# Patient Record
Sex: FEMALE | Race: AMERICAN INDIAN OR ALASKA NATIVE | NOT HISPANIC OR LATINO | Employment: OTHER | URBAN - METROPOLITAN AREA
[De-identification: names, ages, dates, MRNs, and addresses within clinical notes are randomized per-mention and may not be internally consistent; named-entity substitution may affect disease eponyms.]

---

## 2021-06-10 ENCOUNTER — OFFICE VISIT (OUTPATIENT)
Dept: CARDIOLOGY | Facility: MEDICAL CENTER | Age: 76
End: 2021-06-10
Payer: MEDICARE

## 2021-06-10 VITALS
WEIGHT: 218 LBS | HEIGHT: 61 IN | SYSTOLIC BLOOD PRESSURE: 122 MMHG | HEART RATE: 79 BPM | RESPIRATION RATE: 16 BRPM | DIASTOLIC BLOOD PRESSURE: 80 MMHG | BODY MASS INDEX: 41.16 KG/M2 | OXYGEN SATURATION: 92 %

## 2021-06-10 DIAGNOSIS — R00.2 PALPITATIONS: ICD-10-CM

## 2021-06-10 DIAGNOSIS — R06.09 DYSPNEA ON EFFORT: ICD-10-CM

## 2021-06-10 DIAGNOSIS — M79.89 LEG SWELLING: ICD-10-CM

## 2021-06-10 DIAGNOSIS — E11.65 TYPE 2 DIABETES MELLITUS WITH HYPERGLYCEMIA, WITHOUT LONG-TERM CURRENT USE OF INSULIN (HCC): ICD-10-CM

## 2021-06-10 DIAGNOSIS — I10 HYPERTENSION, UNSPECIFIED TYPE: ICD-10-CM

## 2021-06-10 LAB — EKG IMPRESSION: NORMAL

## 2021-06-10 PROCEDURE — 99204 OFFICE O/P NEW MOD 45 MIN: CPT | Mod: 25 | Performed by: INTERNAL MEDICINE

## 2021-06-10 PROCEDURE — 93000 ELECTROCARDIOGRAM COMPLETE: CPT | Performed by: INTERNAL MEDICINE

## 2021-06-10 RX ORDER — FUROSEMIDE 40 MG/1
40 TABLET ORAL DAILY
Qty: 30 TABLET | Refills: 11 | Status: SHIPPED | OUTPATIENT
Start: 2021-06-10

## 2021-06-10 RX ORDER — SPIRONOLACTONE 25 MG/1
25 TABLET ORAL DAILY
Qty: 30 TABLET | Refills: 3 | Status: SHIPPED | OUTPATIENT
Start: 2021-06-10 | End: 2021-10-01

## 2021-06-10 ASSESSMENT — ENCOUNTER SYMPTOMS
BLURRED VISION: 0
HEADACHES: 0
MEMORY LOSS: 0
SENSORY CHANGE: 0
MYALGIAS: 0
DIAPHORESIS: 0
PALPITATIONS: 0
FEVER: 0
DOUBLE VISION: 0
FALLS: 0
COUGH: 0
SHORTNESS OF BREATH: 1
BRUISES/BLEEDS EASILY: 0
ABDOMINAL PAIN: 0
DEPRESSION: 0
DIZZINESS: 0

## 2021-06-10 ASSESSMENT — MINNESOTA LIVING WITH HEART FAILURE QUESTIONNAIRE (MLHF)
MAKING YOU SHORT OF BREATH: 5
COSTING YOU MONEY FOR MEDICAL CARE: 0
DIFFICULTY SLEEPING WELL AT NIGHT: 0
SWELLING IN ANKLES OR LEGS: 5
GIVING YOU SIDE EFFECTS FROM TREATMENTS: 0
LOSS OF SELF CONTROL IN YOUR LIFE: 5
DIFFICULTY WITH RECREATIONAL PASTIMES, SPORTS, HOBBIES: 5
WORKING AROUND THE HOUSE OR YARD DIFFICULT: 5
MAKING YOU WORRY: 5
HAVING TO SIT OR LIE DOWN DURING THE DAY: 5
DIFFICULTY SOCIALIZING WITH FAMILY OR FRIENDS: 3
EATING LESS FOODS YOU LIKE: 0
MAKING YOU STAY IN A HOSPITAL: 0
DIFFICULTY WITH SEXUAL ACTIVITIES: 0
TOTAL_SCORE: 68
DIFFICULTY GOING AWAY FROM HOME: 5
DIFFICULTY TO CONCENTRATE OR REMEMBERING THINGS: 5
TIRED, FATIGUED OR LOW ON ENERGY: 5
FEELING LIKE A BURDEN TO FAMILY AND FRIENDS: 0
DIFFICULTY WORKING TO EARN A LIVING: 5
MAKING YOU FEEL DEPRESSED: 5
WALKING ABOUT OR CLIMBING STAIRS DIFFICULT: 5

## 2021-06-10 NOTE — PROGRESS NOTES
Chief Complaint   Patient presents with   • Congestive Heart Failure       Subjective:   Rosmery Landaverde is a 76 y.o. female who presents today for cardiac care and management in a heart failure clinic due to leg swelling and shortness of breath.  Patient also has a diagnosis of diabetes and hypertension.    Patient still gets winded with daily living activities and exertion. No symptoms at rest.    We do not have any records on prior cardiac work-up at this time.    No family history of sudden cardiac death.    I personally interpreted EKG tracing which shows sinus rhythm without evidence of coronary ischemia.    History reviewed. No pertinent past medical history.  History reviewed. No pertinent surgical history.  History reviewed. No pertinent family history.  Social History     Socioeconomic History   • Marital status: Single     Spouse name: Not on file   • Number of children: Not on file   • Years of education: Not on file   • Highest education level: Not on file   Occupational History   • Not on file   Tobacco Use   • Smoking status: Current Some Day Smoker     Types: Cigarettes     Start date: 6/10/1962   • Smokeless tobacco: Never Used   Vaping Use   • Vaping Use: Never used   Substance and Sexual Activity   • Alcohol use: Yes     Alcohol/week: 2.4 oz     Types: 4 Cans of beer per week   • Drug use: Never   • Sexual activity: Not on file   Other Topics Concern   • Not on file   Social History Narrative   • Not on file     Social Determinants of Health     Financial Resource Strain:    • Difficulty of Paying Living Expenses:    Food Insecurity:    • Worried About Running Out of Food in the Last Year:    • Ran Out of Food in the Last Year:    Transportation Needs:    • Lack of Transportation (Medical):    • Lack of Transportation (Non-Medical):    Physical Activity:    • Days of Exercise per Week:    • Minutes of Exercise per Session:    Stress:    • Feeling of Stress :    Social Connections:    • Frequency  "of Communication with Friends and Family:    • Frequency of Social Gatherings with Friends and Family:    • Attends [employer] Services:    • Active Member of Clubs or Organizations:    • Attends Club or Organization Meetings:    • Marital Status:    Intimate Partner Violence:    • Fear of Current or Ex-Partner:    • Emotionally Abused:    • Physically Abused:    • Sexually Abused:      Allergies   Allergen Reactions   • Morphine      Severe reaction      Outpatient Encounter Medications as of 6/10/2021   Medication Sig Dispense Refill   • furosemide (LASIX) 40 MG Tab Take 1 tablet by mouth every day. 30 tablet 11   • spironolactone (ALDACTONE) 25 MG Tab Take 1 tablet by mouth every day. 30 tablet 3     No facility-administered encounter medications on file as of 6/10/2021.     Review of Systems   Constitutional: Negative for diaphoresis and fever.   HENT: Negative for nosebleeds.    Eyes: Negative for blurred vision and double vision.   Respiratory: Positive for shortness of breath. Negative for cough.    Cardiovascular: Positive for leg swelling. Negative for chest pain and palpitations.   Gastrointestinal: Negative for abdominal pain.   Genitourinary: Negative for dysuria and frequency.   Musculoskeletal: Negative for falls and myalgias.   Skin: Negative for rash.   Neurological: Negative for dizziness, sensory change and headaches.   Endo/Heme/Allergies: Does not bruise/bleed easily.   Psychiatric/Behavioral: Negative for depression and memory loss.        Objective:   /80 (BP Location: Left arm, Patient Position: Sitting, BP Cuff Size: Adult)   Pulse 79   Resp 16   Ht 1.549 m (5' 1\")   Wt 98.9 kg (218 lb)   SpO2 92%   BMI 41.19 kg/m²     Physical Exam   Constitutional: She is oriented to person, place, and time. No distress.   HENT:   Head: Normocephalic and atraumatic.   Right Ear: External ear normal.   Left Ear: External ear normal.   Eyes: Right eye exhibits no discharge. Left eye exhibits no " discharge.   Neck: No JVD present. No thyromegaly present.   Cardiovascular: Normal rate, regular rhythm and normal heart sounds. Exam reveals no gallop and no friction rub.   No murmur heard.  Pulmonary/Chest: Breath sounds normal. No respiratory distress.   Abdominal: Bowel sounds are normal. She exhibits no distension. There is no abdominal tenderness.   Musculoskeletal:         General: Swelling present. No tenderness.      Right lower leg: Edema present.      Left lower leg: Edema present.   Neurological: She is alert and oriented to person, place, and time. No cranial nerve deficit.   Skin: Skin is warm and dry. She is not diaphoretic.   Psychiatric: Her behavior is normal.   Nursing note and vitals reviewed.      Assessment:     1. Leg swelling  EKG   2. Dyspnea on effort  EKG    EC-ECHOCARDIOGRAM COMPLETE W/O CONT   3. Hypertension, unspecified type  EKG    EC-ECHOCARDIOGRAM COMPLETE W/O CONT   4. Palpitations  EKG    EC-ECHOCARDIOGRAM COMPLETE W/O CONT   5. Type 2 diabetes mellitus with hyperglycemia, without long-term current use of insulin (HCC)         Medical Decision Making:  Today's Assessment / Status / Plan:   Patient does have significant swelling in her legs.  Patient appears to be volume overloaded.  At this time, I will start spironolactone along with furosemide for diuresis.  We will also check transthoracic echocardiogram to obtain for cardiac function and valvular function.  I will continue to closely monitor for side effects of patient's high risk medication(s) including liver, renal function and electrolytes.

## 2021-06-28 ENCOUNTER — HOSPITAL ENCOUNTER (OUTPATIENT)
Dept: LAB | Facility: MEDICAL CENTER | Age: 76
End: 2021-06-28
Attending: INTERNAL MEDICINE
Payer: MEDICARE

## 2021-06-28 ENCOUNTER — OFFICE VISIT (OUTPATIENT)
Dept: CARDIOLOGY | Facility: MEDICAL CENTER | Age: 76
End: 2021-06-28
Payer: MEDICARE

## 2021-06-28 VITALS
WEIGHT: 213 LBS | OXYGEN SATURATION: 91 % | HEIGHT: 61 IN | BODY MASS INDEX: 40.22 KG/M2 | SYSTOLIC BLOOD PRESSURE: 118 MMHG | DIASTOLIC BLOOD PRESSURE: 78 MMHG | HEART RATE: 92 BPM

## 2021-06-28 DIAGNOSIS — M79.89 LEG SWELLING: ICD-10-CM

## 2021-06-28 DIAGNOSIS — E11.65 TYPE 2 DIABETES MELLITUS WITH HYPERGLYCEMIA, WITHOUT LONG-TERM CURRENT USE OF INSULIN (HCC): ICD-10-CM

## 2021-06-28 DIAGNOSIS — R06.09 DYSPNEA ON EFFORT: ICD-10-CM

## 2021-06-28 DIAGNOSIS — R00.2 PALPITATIONS: ICD-10-CM

## 2021-06-28 DIAGNOSIS — Z79.899 HIGH RISK MEDICATIONS (NOT ANTICOAGULANTS) LONG-TERM USE: ICD-10-CM

## 2021-06-28 LAB
ANION GAP SERPL CALC-SCNC: 12 MMOL/L (ref 7–16)
BUN SERPL-MCNC: 18 MG/DL (ref 8–22)
CALCIUM SERPL-MCNC: 10.2 MG/DL (ref 8.5–10.5)
CHLORIDE SERPL-SCNC: 101 MMOL/L (ref 96–112)
CO2 SERPL-SCNC: 28 MMOL/L (ref 20–33)
CREAT SERPL-MCNC: 1.17 MG/DL (ref 0.5–1.4)
GLUCOSE SERPL-MCNC: 149 MG/DL (ref 65–99)
POTASSIUM SERPL-SCNC: 4.1 MMOL/L (ref 3.6–5.5)
SODIUM SERPL-SCNC: 141 MMOL/L (ref 135–145)

## 2021-06-28 PROCEDURE — 36415 COLL VENOUS BLD VENIPUNCTURE: CPT

## 2021-06-28 PROCEDURE — 80048 BASIC METABOLIC PNL TOTAL CA: CPT

## 2021-06-28 PROCEDURE — 99214 OFFICE O/P EST MOD 30 MIN: CPT | Performed by: INTERNAL MEDICINE

## 2021-06-28 RX ORDER — DOCUSATE SODIUM 100 MG/1
100 CAPSULE, LIQUID FILLED ORAL 2 TIMES DAILY
COMMUNITY

## 2021-06-28 RX ORDER — MELOXICAM 15 MG/1
15 TABLET ORAL DAILY
COMMUNITY
End: 2021-11-11

## 2021-06-28 RX ORDER — BUPROPION HYDROCHLORIDE 150 MG/1
150 TABLET, EXTENDED RELEASE ORAL 2 TIMES DAILY
COMMUNITY

## 2021-06-28 ASSESSMENT — ENCOUNTER SYMPTOMS
HEADACHES: 0
SENSORY CHANGE: 0
DIZZINESS: 0
DIAPHORESIS: 0
SHORTNESS OF BREATH: 1
BLURRED VISION: 0
ABDOMINAL PAIN: 0
BRUISES/BLEEDS EASILY: 0
PALPITATIONS: 0
DOUBLE VISION: 0
COUGH: 0
FALLS: 0
MYALGIAS: 0
MEMORY LOSS: 0
DEPRESSION: 0
FEVER: 0

## 2021-06-28 NOTE — PATIENT INSTRUCTIONS
No change in medicaitons.    Blood test as soon as possible (non-fasting).    Echocardiogram appointment on 08/19/2021 at 10:15 AM.

## 2021-08-19 ENCOUNTER — HOSPITAL ENCOUNTER (OUTPATIENT)
Dept: CARDIOLOGY | Facility: MEDICAL CENTER | Age: 76
End: 2021-08-19
Attending: INTERNAL MEDICINE
Payer: OTHER GOVERNMENT

## 2021-08-19 DIAGNOSIS — R06.09 DYSPNEA ON EFFORT: ICD-10-CM

## 2021-08-19 DIAGNOSIS — I10 HYPERTENSION, UNSPECIFIED TYPE: ICD-10-CM

## 2021-08-19 DIAGNOSIS — R00.2 PALPITATIONS: ICD-10-CM

## 2021-08-19 PROCEDURE — 93306 TTE W/DOPPLER COMPLETE: CPT

## 2021-08-20 LAB
LV EJECT FRACT  99904: 60
LV EJECT FRACT MOD 2C 99903: 60.67
LV EJECT FRACT MOD 4C 99902: 51.19
LV EJECT FRACT MOD BP 99901: 57.42

## 2021-08-20 PROCEDURE — 93306 TTE W/DOPPLER COMPLETE: CPT | Mod: 26 | Performed by: INTERNAL MEDICINE

## 2021-10-01 ENCOUNTER — OFFICE VISIT (OUTPATIENT)
Dept: CARDIOLOGY | Facility: MEDICAL CENTER | Age: 76
End: 2021-10-01
Payer: MEDICARE

## 2021-10-01 ENCOUNTER — PHARMACY VISIT (OUTPATIENT)
Dept: PHARMACY | Facility: MEDICAL CENTER | Age: 76
End: 2021-10-01
Payer: COMMERCIAL

## 2021-10-01 VITALS
HEIGHT: 61 IN | BODY MASS INDEX: 38.8 KG/M2 | SYSTOLIC BLOOD PRESSURE: 130 MMHG | OXYGEN SATURATION: 95 % | DIASTOLIC BLOOD PRESSURE: 70 MMHG | HEART RATE: 97 BPM | RESPIRATION RATE: 13 BRPM | WEIGHT: 205.5 LBS

## 2021-10-01 DIAGNOSIS — M79.89 LEG SWELLING: ICD-10-CM

## 2021-10-01 DIAGNOSIS — R06.09 DYSPNEA ON EFFORT: ICD-10-CM

## 2021-10-01 DIAGNOSIS — R00.2 PALPITATIONS: ICD-10-CM

## 2021-10-01 DIAGNOSIS — Z79.899 HIGH RISK MEDICATIONS (NOT ANTICOAGULANTS) LONG-TERM USE: ICD-10-CM

## 2021-10-01 DIAGNOSIS — E11.65 TYPE 2 DIABETES MELLITUS WITH HYPERGLYCEMIA, WITHOUT LONG-TERM CURRENT USE OF INSULIN (HCC): ICD-10-CM

## 2021-10-01 DIAGNOSIS — N18.31 STAGE 3A CHRONIC KIDNEY DISEASE: ICD-10-CM

## 2021-10-01 DIAGNOSIS — I10 HTN (HYPERTENSION), MALIGNANT: ICD-10-CM

## 2021-10-01 PROCEDURE — RXMED WILLOW AMBULATORY MEDICATION CHARGE: Performed by: INTERNAL MEDICINE

## 2021-10-01 PROCEDURE — 99214 OFFICE O/P EST MOD 30 MIN: CPT | Performed by: INTERNAL MEDICINE

## 2021-10-01 RX ORDER — FINERENONE 10 MG/1
10 TABLET, FILM COATED ORAL DAILY
Qty: 30 TABLET | Refills: 11 | Status: SHIPPED | OUTPATIENT
Start: 2021-10-01 | End: 2021-10-01 | Stop reason: SDUPTHER

## 2021-10-01 RX ORDER — FINERENONE 10 MG/1
10 TABLET, FILM COATED ORAL DAILY
Qty: 30 TABLET | Refills: 0 | Status: SHIPPED | OUTPATIENT
Start: 2021-10-01 | End: 2021-11-04 | Stop reason: SDUPTHER

## 2021-10-01 ASSESSMENT — ENCOUNTER SYMPTOMS
BRUISES/BLEEDS EASILY: 0
DOUBLE VISION: 0
ABDOMINAL PAIN: 0
MEMORY LOSS: 0
MYALGIAS: 0
FALLS: 0
DIZZINESS: 0
DIAPHORESIS: 0
SHORTNESS OF BREATH: 1
PALPITATIONS: 0
HEADACHES: 0
FEVER: 0
SENSORY CHANGE: 0
COUGH: 0
BLURRED VISION: 0
DEPRESSION: 0

## 2021-10-01 ASSESSMENT — MINNESOTA LIVING WITH HEART FAILURE QUESTIONNAIRE (MLHF)
WALKING ABOUT OR CLIMBING STAIRS DIFFICULT: 3
MAKING YOU SHORT OF BREATH: 5
COSTING YOU MONEY FOR MEDICAL CARE: 0
MAKING YOU STAY IN A HOSPITAL: 0
DIFFICULTY GOING AWAY FROM HOME: 4
FEELING LIKE A BURDEN TO FAMILY AND FRIENDS: 2
SWELLING IN ANKLES OR LEGS: 2
HAVING TO SIT OR LIE DOWN DURING THE DAY: 4
WORKING AROUND THE HOUSE OR YARD DIFFICULT: 3
LOSS OF SELF CONTROL IN YOUR LIFE: 2
TIRED, FATIGUED OR LOW ON ENERGY: 5
TOTAL_SCORE: 46
EATING LESS FOODS YOU LIKE: 2
DIFFICULTY SLEEPING WELL AT NIGHT: 3
MAKING YOU WORRY: 2
DIFFICULTY TO CONCENTRATE OR REMEMBERING THINGS: 3
GIVING YOU SIDE EFFECTS FROM TREATMENTS: 0
DIFFICULTY WITH RECREATIONAL PASTIMES, SPORTS, HOBBIES: 0
DIFFICULTY WORKING TO EARN A LIVING: 0
MAKING YOU FEEL DEPRESSED: 2
DIFFICULTY SOCIALIZING WITH FAMILY OR FRIENDS: 4
DIFFICULTY WITH SEXUAL ACTIVITIES: 0

## 2021-11-03 ENCOUNTER — TELEPHONE (OUTPATIENT)
Dept: SCHEDULING | Facility: IMAGING CENTER | Age: 76
End: 2021-11-03

## 2021-11-03 DIAGNOSIS — N18.31 STAGE 3A CHRONIC KIDNEY DISEASE: ICD-10-CM

## 2021-11-03 DIAGNOSIS — E11.65 TYPE 2 DIABETES MELLITUS WITH HYPERGLYCEMIA, WITHOUT LONG-TERM CURRENT USE OF INSULIN (HCC): ICD-10-CM

## 2021-11-03 NOTE — TELEPHONE ENCOUNTER
TT    Cadence Loera a nurse practioner at [facility] [place][note 2] called stating Pt cannot afford Finerenone (KERENDIA) 10 MG Tab and would like to know if Pt can take eplereone or aldactone instead. Cadence states she can prescribe the Pt one of the medications but wanted to check with TT first. Cadence can be reached at [phone] ext [phone].     Thank you

## 2021-11-04 DIAGNOSIS — E11.65 TYPE 2 DIABETES MELLITUS WITH HYPERGLYCEMIA, WITHOUT LONG-TERM CURRENT USE OF INSULIN (HCC): ICD-10-CM

## 2021-11-04 DIAGNOSIS — N18.31 STAGE 3A CHRONIC KIDNEY DISEASE: ICD-10-CM

## 2021-11-04 RX ORDER — FINERENONE 10 MG/1
10 TABLET, FILM COATED ORAL DAILY
Qty: 90 TABLET | Refills: 3 | Status: SHIPPED | OUTPATIENT
Start: 2021-11-04 | End: 2022-11-02

## 2021-11-04 RX ORDER — FINERENONE 10 MG/1
10 TABLET, FILM COATED ORAL DAILY
Qty: 90 TABLET | Refills: 3 | Status: SHIPPED | OUTPATIENT
Start: 2021-11-04 | End: 2021-11-04 | Stop reason: SDUPTHER

## 2021-11-04 NOTE — TELEPHONE ENCOUNTER
Wali [employer] Ass't  You 3 hours ago (6:50 AM)     Upon chart review, patient is covered through [facility]. Called back at phone number provided, LVM for Cadence asking for CB to discuss getting the pt a voucher. Also, it doesn't look like we ever sent the Kerendia Rx to her pharmacy.. this is the only way to start the PA process so that I get notified. Thanks   Susi     Message text     You  Wali [employer] Ass't 20 hours ago (1:50 PM)   KG  Hi! It looks like  To just started this patient on Kerendia on 10/01/21. I didn't see that the assistance process had been started. Can you please look into this? Thanks!!    Message text      ___________________________________________________________________________    Kerendia RX sent to [facility].

## 2021-11-05 ENCOUNTER — TELEPHONE (OUTPATIENT)
Dept: CARDIOLOGY | Facility: MEDICAL CENTER | Age: 76
End: 2021-11-05

## 2021-11-05 NOTE — TELEPHONE ENCOUNTER
LVM for pt in regards to lab work that was ordered at previous OV with LENIN Knowles . Office number given for call back if pt has any questions or has had lab work done outside of [facility]. Pt has follow up appointment scheduled with LENIN Knowles on 11/11/2021.

## 2021-11-11 ENCOUNTER — PHARMACY VISIT (OUTPATIENT)
Dept: PHARMACY | Facility: MEDICAL CENTER | Age: 76
End: 2021-11-11
Payer: COMMERCIAL

## 2021-11-11 ENCOUNTER — OFFICE VISIT (OUTPATIENT)
Dept: CARDIOLOGY | Facility: MEDICAL CENTER | Age: 76
End: 2021-11-11
Payer: MEDICARE

## 2021-11-11 VITALS
BODY MASS INDEX: 38.71 KG/M2 | HEIGHT: 61 IN | WEIGHT: 205 LBS | SYSTOLIC BLOOD PRESSURE: 108 MMHG | OXYGEN SATURATION: 91 % | HEART RATE: 96 BPM | DIASTOLIC BLOOD PRESSURE: 66 MMHG

## 2021-11-11 DIAGNOSIS — R06.83 SNORES: ICD-10-CM

## 2021-11-11 DIAGNOSIS — M79.89 LEG SWELLING: ICD-10-CM

## 2021-11-11 DIAGNOSIS — E11.65 TYPE 2 DIABETES MELLITUS WITH HYPERGLYCEMIA, WITHOUT LONG-TERM CURRENT USE OF INSULIN (HCC): ICD-10-CM

## 2021-11-11 DIAGNOSIS — I50.9 HEART FAILURE, NYHA CLASS 2 (HCC): ICD-10-CM

## 2021-11-11 DIAGNOSIS — Z79.899 HIGH RISK MEDICATIONS (NOT ANTICOAGULANTS) LONG-TERM USE: ICD-10-CM

## 2021-11-11 DIAGNOSIS — I10 HTN (HYPERTENSION), MALIGNANT: ICD-10-CM

## 2021-11-11 DIAGNOSIS — I50.30 ACC/AHA STAGE C HEART FAILURE WITH PRESERVED EJECTION FRACTION (HCC): ICD-10-CM

## 2021-11-11 DIAGNOSIS — R00.2 PALPITATIONS: ICD-10-CM

## 2021-11-11 DIAGNOSIS — N18.31 STAGE 3A CHRONIC KIDNEY DISEASE: ICD-10-CM

## 2021-11-11 PROCEDURE — 99214 OFFICE O/P EST MOD 30 MIN: CPT | Performed by: NURSE PRACTITIONER

## 2021-11-11 PROCEDURE — RXMED WILLOW AMBULATORY MEDICATION CHARGE: Performed by: NURSE PRACTITIONER

## 2021-11-11 RX ORDER — FINERENONE 10 MG/1
10 TABLET, FILM COATED ORAL DAILY
Qty: 30 TABLET | Refills: 0 | COMMUNITY
Start: 2021-11-11 | End: 2022-01-31

## 2021-11-11 ASSESSMENT — ENCOUNTER SYMPTOMS
ABDOMINAL PAIN: 0
PND: 0
DIZZINESS: 1
CLAUDICATION: 0
COUGH: 0
MYALGIAS: 0
ORTHOPNEA: 1
FEVER: 0
SHORTNESS OF BREATH: 1
PALPITATIONS: 0

## 2021-11-11 NOTE — PROGRESS NOTES
Chief Complaint   Patient presents with   • Congestive Heart Failure   • Palpitations       Subjective     Rosmery Landaverde is a 76 y.o. female who presents today for follow-up on her lower extremity edema, shortness of breath, diabetes, hypertension with her daughter, Evon.    Patient Dr. Soto.  She was last seen in clinic on 10/1/2021.  During that visit, she was recommended to stop spironolactone and start Kerendia.  Patient reports she was able to start the medication okay, but has run out due to her pharmacy not caring the medication.  Patient also stating that the medication has a high co-pay of $600.    Patient is wondering if she should switch to a different medication.    Patient did get follow-up lab testing in [place], [place]    Patient otherwise is reporting continued shortness of breath, some continued lower extremity edema, orthopnea where she sleeps in the recliner, urinary frequency and nocturia, and dizziness.  She denies chest pain, palpitations, PND.    She has not been weighing herself at home.    Patient's daughter reports patient does snore and notices possible apneic periods.    Patient has not been monitoring her sodium take, but states she does not use a lot of salt.    Patient reports questionable history of diabetes.  She states her primary care recently discontinued her Metformin for unknown reasons.    Patient lives in [place], [place].    History reviewed. No pertinent past medical history.  History reviewed. No pertinent surgical history.  History reviewed. No pertinent family history.  Social History     Socioeconomic History   • Marital status: Single     Spouse name: Not on file   • Number of children: Not on file   • Years of education: Not on file   • Highest education level: Not on file   Occupational History   • Not on file   Tobacco Use   • Smoking status: Current Some Day Smoker     Packs/day: 0.50     Types: Cigarettes     Start date: 6/10/1962   • Smokeless tobacco:  Never Used   • Tobacco comment: smokes 1/2 Pack on some day of the week, someday no smoking   Vaping Use   • Vaping Use: Never used   Substance and Sexual Activity   • Alcohol use: Yes     Alcohol/week: 2.4 oz     Types: 4 Cans of beer per week     Comment: few beers a couple days per week     • Drug use: Never   • Sexual activity: Not on file   Other Topics Concern   • Not on file   Social History Narrative   • Not on file     Social Determinants of Health     Financial Resource Strain:    • Difficulty of Paying Living Expenses: Not on file   Food Insecurity:    • Worried About Running Out of Food in the Last Year: Not on file   • Ran Out of Food in the Last Year: Not on file   Transportation Needs:    • Lack of Transportation (Medical): Not on file   • Lack of Transportation (Non-Medical): Not on file   Physical Activity:    • Days of Exercise per Week: Not on file   • Minutes of Exercise per Session: Not on file   Stress:    • Feeling of Stress : Not on file   Social Connections:    • Frequency of Communication with Friends and Family: Not on file   • Frequency of Social Gatherings with Friends and Family: Not on file   • Attends L[employer] Services: Not on file   • Active Member of Clubs or Organizations: Not on file   • Attends Club or Organization Meetings: Not on file   • Marital Status: Not on file   Intimate Partner Violence:    • Fear of Current or Ex-Partner: Not on file   • Emotionally Abused: Not on file   • Physically Abused: Not on file   • Sexually Abused: Not on file   Housing Stability:    • Unable to Pay for Housing in the Last Year: Not on file   • Number of Places Lived in the Last Year: Not on file   • Unstable Housing in the Last Year: Not on file     Allergies   Allergen Reactions   • Morphine      Severe reaction      Outpatient Encounter Medications as of 11/11/2021   Medication Sig Dispense Refill   • Finerenone (KERENDIA) 10 MG Tab Take 10 mg by mouth every day. 30 Tablet 0   • docusate  "sodium (COLACE) 100 MG Cap Take 100 mg by mouth 2 times a day.     • metoprolol tartrate (LOPRESSOR) 25 MG Tab Take 25 mg by mouth 2 times a day.     • buPROPion SR (WELLBUTRIN-SR) 150 MG TABLET SR 12 HR sustained-release tablet Take 150 mg by mouth 2 times a day.     • furosemide (LASIX) 40 MG Tab Take 1 tablet by mouth every day. 30 tablet 11   • Finerenone (KERENDIA) 10 MG Tab Take 10 mg by mouth every day. (Patient not taking: Reported on 11/11/2021) 90 Tablet 3   • [DISCONTINUED] meloxicam (MOBIC) 15 MG tablet Take 15 mg by mouth every day. (Patient not taking: Reported on 11/11/2021)     • [DISCONTINUED] metFORMIN (GLUCOPHAGE) 500 MG Tab Take 500 mg by mouth 2 times a day with meals. (Patient not taking: Reported on 11/11/2021)       No facility-administered encounter medications on file as of 11/11/2021.     Review of Systems   Constitutional: Negative for fever and malaise/fatigue.   Respiratory: Positive for shortness of breath. Negative for cough.    Cardiovascular: Positive for orthopnea (in recliner) and leg swelling. Negative for chest pain, palpitations, claudication and PND.   Gastrointestinal: Negative for abdominal pain.   Genitourinary: Positive for frequency (and Nocturia).   Musculoskeletal: Negative for myalgias.   Neurological: Positive for dizziness.   All other systems reviewed and are negative.             Objective     /66 (BP Location: Right arm, Patient Position: Sitting, BP Cuff Size: Adult)   Pulse 96   Ht 1.549 m (5' 1\")   Wt 93 kg (205 lb)   SpO2 91%   BMI 38.73 kg/m²     Physical Exam  Vitals reviewed.   Constitutional:       Appearance: She is well-developed. She is obese.   HENT:      Head: Normocephalic and atraumatic.   Eyes:      Pupils: Pupils are equal, round, and reactive to light.   Neck:      Vascular: No JVD.   Cardiovascular:      Rate and Rhythm: Normal rate and regular rhythm.      Heart sounds: Normal heart sounds.   Pulmonary:      Effort: Pulmonary effort " is normal. No respiratory distress.      Breath sounds: Normal breath sounds. No wheezing or rales.   Abdominal:      General: Bowel sounds are normal.      Palpations: Abdomen is soft.   Musculoskeletal:         General: Normal range of motion.      Cervical back: Normal range of motion and neck supple.      Right lower leg: Edema present.      Left lower leg: Edema present.   Skin:     General: Skin is warm and dry.   Neurological:      General: No focal deficit present.      Mental Status: She is alert and oriented to person, place, and time.   Psychiatric:         Behavior: Behavior normal.       No results found for: CHOLSTRLTOT, LDL, HDL, TRIGLYCERIDE    Lab Results   Component Value Date/Time    SODIUM 141 06/28/2021 11:53 AM    POTASSIUM 4.1 06/28/2021 11:53 AM    CHLORIDE 101 06/28/2021 11:53 AM    CO2 28 06/28/2021 11:53 AM    GLUCOSE 149 (H) 06/28/2021 11:53 AM    BUN 18 06/28/2021 11:53 AM    CREATININE 1.17 06/28/2021 11:53 AM     No results found for: ALKPHOSPHAT, ASTSGOT, ALTSGPT, TBILIRUBIN     Transthoracic Echo Report 8/19/2021  No prior study is available for comparison.   Normal left ventricular systolic function.   No evidence of valvular abnormality based on Doppler evaluation.           Assessment & Plan     1. Leg swelling  Referral to Pulmonary and Sleep Medicine   2. Snores  Referral to Pulmonary and Sleep Medicine   3. Type 2 diabetes mellitus with hyperglycemia, without long-term current use of insulin ([facility])     4. Stage 3a chronic kidney disease (HCC)     5. Palpitations     6. High risk medications (not anticoagulants) long-term use     7. HTN (hypertension), malignant     8. Heart failure, NYHA class 2 (HCC)     9. ACC/AHA stage C heart failure with preserved ejection fraction (HCC)         Medical Decision Making: Today's Assessment/Status/Plan:        HFpEF, Stage C, Class 2, LVEF 60%: Patient does continue to exhibit some lower extremity edema  -Patient has comorbid obesity,  questionable sleep apnea, patient has enlarged RV per echo  -Will hold off on changes to her regimen until lab results received  -Patient to reduce sodium intake, patient educated, will have HF RN contact patient for further education  -Continue furosemide 40 mg daily  -Will consider follow-up lab testing after lab results received  -Reinforced s/sx of worsening heart failure with patient and weight monitoring. Pt verbalizes understanding. Pt to call office or RTC if present.     CKD stage III:  -Continue Kerendia 10 mg daily, 30-day sample sent to [facility]  -[employer] tech to look into PA for medication  -If unable to afford, will switch patient to spironolactone or eplerenone    Diabetes: Questionable  -Patient no longer on Metformin per PCP  -Patient encouraged to continue follow-up with PCP    Hypertension:  -Stable  -Continue metoprolol 25 mg twice a day    Snoring:  -Discussed with patient findings of echocardiogram and concern of sleep apnea with her symptoms  -Discussed referral to sleep medicine, patient agreeable    FU in clinic in 6 weeks, will update patient if additional labs needed prior to follow-up. Sooner if needed.    Patient verbalizes understanding and agrees with the plan of care.     PLEASE NOTE: This Note was created using voice recognition Software. I have made every reasonable attempt to correct obvious errors, but I expect that there are errors of grammar and possibly content that I did not discover before finalizing the note

## 2021-11-11 NOTE — PATIENT INSTRUCTIONS
a sample of Kerendia at [facility] on [address] [facility]    Referral to sleep studies, Call [phone] if not scheduled by 1-2 weeks and schedule an appointment    We will notify you if I need additional lab testing once we receive lab results    Monitor sodium intake, try to consume less than 2000 mg of sodium per day

## 2021-11-12 ENCOUNTER — TELEPHONE (OUTPATIENT)
Dept: CARDIOLOGY | Facility: MEDICAL CENTER | Age: 76
End: 2021-11-12

## 2021-11-12 NOTE — TELEPHONE ENCOUNTER
Received call from LENIN Ren regarding patients Kerendia co-pay.    Called patients [facility] pharmacy spoke to Bret. Gave Kerendia voucher program info, Bret to CB with any issues, left CB number.     Free to patient.

## 2021-12-06 ENCOUNTER — TELEPHONE (OUTPATIENT)
Dept: CARDIOLOGY | Facility: MEDICAL CENTER | Age: 76
End: 2021-12-06

## 2021-12-06 NOTE — TELEPHONE ENCOUNTER
Attempted Calling patient for diet and heart failure education, patient did not answer and VM was full, will try again at another time

## 2021-12-23 ENCOUNTER — TELEPHONE (OUTPATIENT)
Dept: CARDIOLOGY | Facility: MEDICAL CENTER | Age: 76
End: 2021-12-23

## 2021-12-23 NOTE — TELEPHONE ENCOUNTER
Called patient regarding dietary sodium intake, patient does salt her food when cooking but she states she uses very little, suggested trying other spices or [employer] products. Patient verbalized needing to be around 2000mg of sodium daily but admitted that she does not read her nutrition labels. When asked what she ate last night she said a pancake with butter and syrup, but she was planning to have a cup of noodles as well. I educated patient about the amount of sodium in these products. Requested patient go through her pantry and read nutrition labels and we will discuss these things further on Wednesday at 11am. Patient agreed and will write down questions as she reads labels. Patient sees Shamika on Monday.

## 2021-12-27 ENCOUNTER — APPOINTMENT (OUTPATIENT)
Dept: CARDIOLOGY | Facility: MEDICAL CENTER | Age: 76
End: 2021-12-27
Payer: MEDICARE

## 2021-12-29 ENCOUNTER — TELEPHONE (OUTPATIENT)
Dept: CARDIOLOGY | Facility: MEDICAL CENTER | Age: 76
End: 2021-12-29

## 2021-12-29 NOTE — TELEPHONE ENCOUNTER
Called patient as scheduled to discuss dietary sodium restrictions, VM was full and could not leave message, will try again later.

## 2022-01-31 ENCOUNTER — HOSPITAL ENCOUNTER (OUTPATIENT)
Dept: LAB | Facility: MEDICAL CENTER | Age: 77
End: 2022-01-31
Attending: NURSE PRACTITIONER
Payer: MEDICARE

## 2022-01-31 ENCOUNTER — OFFICE VISIT (OUTPATIENT)
Dept: CARDIOLOGY | Facility: MEDICAL CENTER | Age: 77
End: 2022-01-31
Payer: MEDICARE

## 2022-01-31 VITALS
OXYGEN SATURATION: 94 % | RESPIRATION RATE: 16 BRPM | WEIGHT: 191 LBS | HEART RATE: 78 BPM | SYSTOLIC BLOOD PRESSURE: 98 MMHG | DIASTOLIC BLOOD PRESSURE: 52 MMHG | BODY MASS INDEX: 36.06 KG/M2 | HEIGHT: 61 IN

## 2022-01-31 DIAGNOSIS — N18.31 STAGE 3A CHRONIC KIDNEY DISEASE: ICD-10-CM

## 2022-01-31 DIAGNOSIS — I10 HTN (HYPERTENSION), MALIGNANT: ICD-10-CM

## 2022-01-31 DIAGNOSIS — I50.30 ACC/AHA STAGE C HEART FAILURE WITH PRESERVED EJECTION FRACTION (HCC): ICD-10-CM

## 2022-01-31 DIAGNOSIS — Z79.899 HIGH RISK MEDICATIONS (NOT ANTICOAGULANTS) LONG-TERM USE: ICD-10-CM

## 2022-01-31 DIAGNOSIS — I50.9 HEART FAILURE, NYHA CLASS 2 (HCC): ICD-10-CM

## 2022-01-31 DIAGNOSIS — R06.83 SNORES: ICD-10-CM

## 2022-01-31 LAB
ANION GAP SERPL CALC-SCNC: 16 MMOL/L (ref 7–16)
BUN SERPL-MCNC: 13 MG/DL (ref 8–22)
CALCIUM SERPL-MCNC: 9.9 MG/DL (ref 8.5–10.5)
CHLORIDE SERPL-SCNC: 96 MMOL/L (ref 96–112)
CO2 SERPL-SCNC: 28 MMOL/L (ref 20–33)
CREAT SERPL-MCNC: 1.47 MG/DL (ref 0.5–1.4)
GLUCOSE SERPL-MCNC: 102 MG/DL (ref 65–99)
POTASSIUM SERPL-SCNC: 3 MMOL/L (ref 3.6–5.5)
SODIUM SERPL-SCNC: 140 MMOL/L (ref 135–145)

## 2022-01-31 PROCEDURE — 36415 COLL VENOUS BLD VENIPUNCTURE: CPT

## 2022-01-31 PROCEDURE — 99214 OFFICE O/P EST MOD 30 MIN: CPT | Performed by: NURSE PRACTITIONER

## 2022-01-31 PROCEDURE — 80048 BASIC METABOLIC PNL TOTAL CA: CPT

## 2022-01-31 RX ORDER — ERGOCALCIFEROL 1.25 MG/1
CAPSULE ORAL
COMMUNITY

## 2022-01-31 ASSESSMENT — ENCOUNTER SYMPTOMS
ORTHOPNEA: 0
ABDOMINAL PAIN: 0
SHORTNESS OF BREATH: 1
PALPITATIONS: 0
DIZZINESS: 0
FEVER: 0
MYALGIAS: 0
COUGH: 0
CLAUDICATION: 0
PND: 0

## 2022-01-31 NOTE — PROGRESS NOTES
Chief Complaint   Patient presents with   • Palpitations   • Leg Swelling       Subjective     Rosmery Landaverde is a 77 y.o. female who presents today for follow-up on her heart failure.    Patient Dr. Soto.  She was last seen in clinic on 11/11/2021.  During that visit, no changes were made to her regimen, she is pending results of her lab tests.    Patient states she had labs done about a month ago at [facility], but no labs are in her chart.    Patient does report feeling well.  She denies significant shortness of breath.  She denies chest pain, palpitations, orthopnea, PND, edema or dizziness/lightheadedness.    She is not weighing herself at home.    She is trying to watch her sodium intake, but admits she needs to be better about reading labels.    She reports compliance with her medications.  She has been able to sort out the cost of her Kerendia.    She did not get her sleep study done.     Patient reports questionable history of diabetes.  She states her primary care recently discontinued her Metformin for unknown reasons.    Patient lives in [place], [place].    History reviewed. No pertinent past medical history.  History reviewed. No pertinent surgical history.  History reviewed. No pertinent family history.  Social History     Socioeconomic History   • Marital status: Single     Spouse name: Not on file   • Number of children: Not on file   • Years of education: Not on file   • Highest education level: Not on file   Occupational History   • Not on file   Tobacco Use   • Smoking status: Current Some Day Smoker     Packs/day: 0.50     Types: Cigarettes     Start date: 6/10/1962   • Smokeless tobacco: Never Used   • Tobacco comment: smokes 1/2 Pack on some day of the week, someday no smoking   Vaping Use   • Vaping Use: Never used   Substance and Sexual Activity   • Alcohol use: Yes     Alcohol/week: 2.4 oz     Types: 4 Cans of beer per week     Comment: few beers a couple days per week     • Drug use: Never    • Sexual activity: Not on file   Other Topics Concern   • Not on file   Social History Narrative   • Not on file     Social Determinants of Health     Financial Resource Strain:    • Difficulty of Paying Living Expenses: Not on file   Food Insecurity:    • Worried About Running Out of Food in the Last Year: Not on file   • Ran Out of Food in the Last Year: Not on file   Transportation Needs:    • Lack of Transportation (Medical): Not on file   • Lack of Transportation (Non-Medical): Not on file   Physical Activity:    • Days of Exercise per Week: Not on file   • Minutes of Exercise per Session: Not on file   Stress:    • Feeling of Stress : Not on file   Social Connections:    • Frequency of Communication with Friends and Family: Not on file   • Frequency of Social Gatherings with Friends and Family: Not on file   • Attends Mosque Services: Not on file   • Active Member of Clubs or Organizations: Not on file   • Attends Club or Organization Meetings: Not on file   • Marital Status: Not on file   Intimate Partner Violence:    • Fear of Current or Ex-Partner: Not on file   • Emotionally Abused: Not on file   • Physically Abused: Not on file   • Sexually Abused: Not on file   Housing Stability:    • Unable to Pay for Housing in the Last Year: Not on file   • Number of Places Lived in the Last Year: Not on file   • Unstable Housing in the Last Year: Not on file     Allergies   Allergen Reactions   • Morphine      Severe reaction      Outpatient Encounter Medications as of 1/31/2022   Medication Sig Dispense Refill   • Empagliflozin 10 MG Tab Take  by mouth.     • vitamin D2, Ergocalciferol, (DRISDOL) 1.25 MG (78174 UT) Cap capsule Take  by mouth every 7 days.     • Finerenone (KERENDIA) 10 MG Tab Take 10 mg by mouth every day. 90 Tablet 3   • docusate sodium (COLACE) 100 MG Cap Take 100 mg by mouth 2 times a day.     • metoprolol tartrate (LOPRESSOR) 25 MG Tab Take 25 mg by mouth 2 times a day.     • buPROPion SR  "(WELLBUTRIN-SR) 150 MG TABLET SR 12 HR sustained-release tablet Take 150 mg by mouth 2 times a day.     • furosemide (LASIX) 40 MG Tab Take 1 tablet by mouth every day. 30 tablet 11   • [DISCONTINUED] Finerenone (KERENDIA) 10 MG Tab Take 10 mg by mouth every day. 30 Tablet 0     No facility-administered encounter medications on file as of 1/31/2022.     Review of Systems   Constitutional: Positive for malaise/fatigue. Negative for fever.   Respiratory: Positive for shortness of breath. Negative for cough.    Cardiovascular: Negative for chest pain, palpitations, orthopnea, claudication, leg swelling and PND.   Gastrointestinal: Negative for abdominal pain.   Musculoskeletal: Negative for myalgias.   Neurological: Negative for dizziness.   All other systems reviewed and are negative.             Objective     BP (!) 98/52 (BP Location: Left arm, Patient Position: Sitting, BP Cuff Size: Adult)   Pulse 78   Resp 16   Ht 1.549 m (5' 1\")   Wt 86.6 kg (191 lb)   SpO2 94%   BMI 36.09 kg/m²     Physical Exam  Vitals reviewed.   Constitutional:       Appearance: She is well-developed. She is obese.   HENT:      Head: Normocephalic and atraumatic.   Eyes:      Pupils: Pupils are equal, round, and reactive to light.   Neck:      Vascular: No JVD.   Cardiovascular:      Rate and Rhythm: Normal rate and regular rhythm.      Heart sounds: Normal heart sounds.   Pulmonary:      Effort: Pulmonary effort is normal. No respiratory distress.      Breath sounds: Normal breath sounds. No wheezing or rales.   Abdominal:      General: Bowel sounds are normal.      Palpations: Abdomen is soft.   Musculoskeletal:         General: Normal range of motion.      Cervical back: Normal range of motion and neck supple.      Right lower leg: Edema (trace) present.      Left lower leg: Edema (trace) present.   Skin:     General: Skin is warm and dry.   Neurological:      General: No focal deficit present.      Mental Status: She is alert and " oriented to person, place, and time.   Psychiatric:         Behavior: Behavior normal.       No results found for: CHOLSTRLTOT, LDL, HDL, TRIGLYCERIDE    Lab Results   Component Value Date/Time    SODIUM 141 06/28/2021 11:53 AM    POTASSIUM 4.1 06/28/2021 11:53 AM    CHLORIDE 101 06/28/2021 11:53 AM    CO2 28 06/28/2021 11:53 AM    GLUCOSE 149 (H) 06/28/2021 11:53 AM    BUN 18 06/28/2021 11:53 AM    CREATININE 1.17 06/28/2021 11:53 AM     No results found for: ALKPHOSPHAT, ASTSGOT, ALTSGPT, TBILIRUBIN     Transthoracic Echo Report 8/19/2021  No prior study is available for comparison.   Normal left ventricular systolic function.   No evidence of valvular abnormality based on Doppler evaluation.           Assessment & Plan     1. Stage 3a chronic kidney disease (HCC)  Basic Metabolic Panel   2. High risk medications (not anticoagulants) long-term use  Basic Metabolic Panel   3. HTN (hypertension), malignant  Basic Metabolic Panel   4. ACC/AHA stage C heart failure with preserved ejection fraction (HCC)  Basic Metabolic Panel   5. Heart failure, NYHA class 2 (HCC)     6. Snores         Medical Decision Making: Today's Assessment/Status/Plan:         Will send pt for labs today.     Will continue to request prior lab testing from [facility].     HFpEF, Stage C, Class 2, LVEF 60%: Patient does have trace bilateral lower extremity edema  -Patient has comorbid obesity, questionable sleep apnea, patient has enlarged RV per echo  -Patient to follow-up with sleep medicine.  She states she has to get the referral through her [facility] clinic.  -Patient to continue to monitor her sodium intake  -Continue furosemide 40 mg daily  -Reinforced s/sx of worsening heart failure with patient and weight monitoring. Pt verbalizes understanding. Pt to call office or RTC if present.     CKD stage III:  -Continue Kerendia 10 mg daily    Diabetes: Questionable  -Patient no longer on Metformin per PCP  -Patient encouraged to continue  follow-up with PCP    Hypertension:  -Stable  -Continue metoprolol 25 mg twice a day    Snoring:  -Patient to follow-up with sleep medicine    FU in clinic in 3 months, will update patient if further testing or changes recommended after lab testing received.. Sooner if needed.    Patient verbalizes understanding and agrees with the plan of care.     PLEASE NOTE: This Note was created using voice recognition Software. I have made every reasonable attempt to correct obvious errors, but I expect that there are errors of grammar and possibly content that I did not discover before finalizing the note

## 2022-02-02 ENCOUNTER — TELEPHONE (OUTPATIENT)
Dept: CARDIOLOGY | Facility: MEDICAL CENTER | Age: 77
End: 2022-02-02

## 2022-02-02 DIAGNOSIS — Z79.899 HIGH RISK MEDICATIONS (NOT ANTICOAGULANTS) LONG-TERM USE: ICD-10-CM

## 2022-02-02 RX ORDER — POTASSIUM CHLORIDE 750 MG/1
10 TABLET, FILM COATED, EXTENDED RELEASE ORAL DAILY
Qty: 90 TABLET | Refills: 3 | Status: SHIPPED | OUTPATIENT
Start: 2022-02-02

## 2022-02-02 NOTE — TELEPHONE ENCOUNTER
NATHALIE Ren.  Allen Garcia R.N.  Please have her start KCL 10 mEq daily and repeat a BMP in 1 month.

## 2022-02-10 ENCOUNTER — TELEPHONE (OUTPATIENT)
Dept: CARDIOLOGY | Facility: MEDICAL CENTER | Age: 77
End: 2022-02-10

## 2022-02-10 NOTE — TELEPHONE ENCOUNTER
SOFÍA    Patient would like you to call [facility] to let them know if there is an alternative to Finerenone (KERENDIA) 10 MG Tab [    Please reach out to them at [phone]    Thank you,  Vanesa RODRIGUEZ

## 2022-02-11 NOTE — TELEPHONE ENCOUNTER
Contacted [employer]. Per chart review    Susi Ryan, Wali Ass't  11/12/21 9:28 AM  Note  Received call from LENIN Ren regarding patients Kerendia co-pay.     Called patients [facility] pharmacy spoke to Bret. Gave Kerendia voucher program info, Bret to CB with any issues, left CB number.      Free to patient.         S/W Susi APODACA- confirmed Kerendia Voucher should be good for 6 months.     S/W Bret who states first that he hasn't filled the medication but secondly then patient has filled RX twice under medicare- NOT voucher. Bret continued to state     After being on hold Bret got back on the line.- initially stated they had no voucher and that new ownership does not accept discount cards. Expressed that this voucher is from the  directly. Suddenly voucher was found and processed.     S/W pt- aware medication should be available next week.

## 2022-05-02 ENCOUNTER — OFFICE VISIT (OUTPATIENT)
Dept: CARDIOLOGY | Facility: MEDICAL CENTER | Age: 77
End: 2022-05-02
Payer: MEDICARE

## 2022-05-02 VITALS
HEIGHT: 61 IN | DIASTOLIC BLOOD PRESSURE: 60 MMHG | BODY MASS INDEX: 35.68 KG/M2 | SYSTOLIC BLOOD PRESSURE: 100 MMHG | WEIGHT: 189 LBS | RESPIRATION RATE: 16 BRPM | OXYGEN SATURATION: 93 % | HEART RATE: 72 BPM

## 2022-05-02 DIAGNOSIS — N18.31 STAGE 3A CHRONIC KIDNEY DISEASE: ICD-10-CM

## 2022-05-02 DIAGNOSIS — Z79.899 HIGH RISK MEDICATIONS (NOT ANTICOAGULANTS) LONG-TERM USE: ICD-10-CM

## 2022-05-02 DIAGNOSIS — I50.9 HEART FAILURE, NYHA CLASS 2 (HCC): ICD-10-CM

## 2022-05-02 DIAGNOSIS — R06.83 SNORES: ICD-10-CM

## 2022-05-02 DIAGNOSIS — E11.65 TYPE 2 DIABETES MELLITUS WITH HYPERGLYCEMIA, WITHOUT LONG-TERM CURRENT USE OF INSULIN (HCC): ICD-10-CM

## 2022-05-02 DIAGNOSIS — I50.30 ACC/AHA STAGE C HEART FAILURE WITH PRESERVED EJECTION FRACTION (HCC): ICD-10-CM

## 2022-05-02 DIAGNOSIS — I10 HTN (HYPERTENSION), MALIGNANT: ICD-10-CM

## 2022-05-02 PROCEDURE — 99214 OFFICE O/P EST MOD 30 MIN: CPT | Performed by: NURSE PRACTITIONER

## 2022-05-02 RX ORDER — SPIRONOLACTONE 25 MG/1
25 TABLET ORAL DAILY
COMMUNITY

## 2022-05-02 ASSESSMENT — ENCOUNTER SYMPTOMS
FEVER: 0
SHORTNESS OF BREATH: 1
ORTHOPNEA: 0
PND: 0
DIZZINESS: 1
MYALGIAS: 0
COUGH: 0
ABDOMINAL PAIN: 0
CLAUDICATION: 0
PALPITATIONS: 0
HEADACHES: 1

## 2022-05-02 NOTE — PROGRESS NOTES
Chief Complaint   Patient presents with   • Chronic Kidney Disease     F/V DX: Stage 3a chronic kidney disease (HCC)   • Leg Swelling       Subjective     Rosmery Landaverde is a 77 y.o. female who presents today for follow-up on her heart failure.    Patient Dr. Soto.  She was last seen in clinic on 1/31/2022.  During that visit, patient was recommended to get lab testing done.  Her potassium level was low and she was started on KCl 10 mEq daily.  She was recommended to get follow-up lab testing.     Patient reports she did not get the instructions and did not get follow-up lab testing done.    She reports continued episodes of feeling tired, she reports minimal shortness of breath.  She also reports episodes of occasional dizziness and headaches.  She denies chest pain, palpitations, orthopnea, PND or edema.    He does mention having some hair loss.    She is not weighing herself at home.    She is trying to watch her sodium intake.    She reports compliance with her medications.  She has been able to sort out the cost of her Kerendia.    She did not get her sleep study done.     Patient reports questionable history of diabetes.  She states her primary care recently discontinued her Metformin for unknown reasons.    Patient lives in [place], [place].    History reviewed. No pertinent past medical history.  History reviewed. No pertinent surgical history.  History reviewed. No pertinent family history.  Social History     Socioeconomic History   • Marital status: Single     Spouse name: Not on file   • Number of children: Not on file   • Years of education: Not on file   • Highest education level: Not on file   Occupational History   • Not on file   Tobacco Use   • Smoking status: Current Some Day Smoker     Packs/day: 0.50     Types: Cigarettes     Start date: 6/10/1962   • Smokeless tobacco: Never Used   • Tobacco comment: smokes 1/2 Pack on some day of the week, someday no smoking   Vaping Use   • Vaping Use: Never  used   Substance and Sexual Activity   • Alcohol use: Yes     Alcohol/week: 2.4 oz     Types: 4 Cans of beer per week     Comment: few beers a couple days per week     • Drug use: Never   • Sexual activity: Not on file   Other Topics Concern   • Not on file   Social History Narrative   • Not on file     Social Determinants of Health     Financial Resource Strain: Not on file   Food Insecurity: Not on file   Transportation Needs: Not on file   Physical Activity: Not on file   Stress: Not on file   Social Connections: Not on file   Intimate Partner Violence: Not on file   Housing Stability: Not on file     Allergies   Allergen Reactions   • Morphine      Severe reaction      Outpatient Encounter Medications as of 5/2/2022   Medication Sig Dispense Refill   • spironolactone (ALDACTONE) 25 MG Tab Take 25 mg by mouth every day.     • potassium chloride ER (KLOR-CON) 10 MEQ tablet Take 1 Tablet by mouth every day. 90 Tablet 3   • Empagliflozin 10 MG Tab Take  by mouth.     • vitamin D2, Ergocalciferol, (DRISDOL) 1.25 MG (48325 UT) Cap capsule Take  by mouth every 7 days.     • Finerenone (KERENDIA) 10 MG Tab Take 10 mg by mouth every day. 90 Tablet 3   • docusate sodium (COLACE) 100 MG Cap Take 100 mg by mouth 2 times a day.     • metoprolol tartrate (LOPRESSOR) 25 MG Tab Take 25 mg by mouth 2 times a day.     • buPROPion SR (WELLBUTRIN-SR) 150 MG TABLET SR 12 HR sustained-release tablet Take 150 mg by mouth 2 times a day.     • furosemide (LASIX) 40 MG Tab Take 1 tablet by mouth every day. 30 tablet 11     No facility-administered encounter medications on file as of 5/2/2022.     Review of Systems   Constitutional: Positive for malaise/fatigue. Negative for fever.   Respiratory: Positive for shortness of breath. Negative for cough.    Cardiovascular: Negative for chest pain, palpitations, orthopnea, claudication, leg swelling and PND.   Gastrointestinal: Negative for abdominal pain.   Musculoskeletal: Negative for  "myalgias.   Neurological: Positive for dizziness (occ) and headaches.   All other systems reviewed and are negative.             Objective     /60 (BP Location: Left arm, Patient Position: Sitting, BP Cuff Size: Adult)   Pulse 72   Resp 16   Ht 1.549 m (5' 1\")   Wt 85.7 kg (189 lb)   SpO2 93%   BMI 35.71 kg/m²     Physical Exam  Vitals reviewed.   Constitutional:       Appearance: She is well-developed. She is obese.   HENT:      Head: Normocephalic and atraumatic.   Eyes:      Pupils: Pupils are equal, round, and reactive to light.   Neck:      Vascular: No JVD.   Cardiovascular:      Rate and Rhythm: Normal rate and regular rhythm.      Heart sounds: Normal heart sounds.   Pulmonary:      Effort: Pulmonary effort is normal. No respiratory distress.      Breath sounds: Normal breath sounds. No wheezing or rales.   Abdominal:      General: Bowel sounds are normal.      Palpations: Abdomen is soft.   Musculoskeletal:         General: Normal range of motion.      Cervical back: Normal range of motion and neck supple.      Right lower leg: Edema (trace) present.      Left lower leg: Edema (trace) present.   Skin:     General: Skin is warm and dry.   Neurological:      General: No focal deficit present.      Mental Status: She is alert and oriented to person, place, and time.   Psychiatric:         Behavior: Behavior normal.       No results found for: CHOLSTRLTOT, LDL, HDL, TRIGLYCERIDE    Lab Results   Component Value Date/Time    SODIUM 140 01/31/2022 12:03 PM    POTASSIUM 3.0 (L) 01/31/2022 12:03 PM    CHLORIDE 96 01/31/2022 12:03 PM    CO2 28 01/31/2022 12:03 PM    GLUCOSE 102 (H) 01/31/2022 12:03 PM    BUN 13 01/31/2022 12:03 PM    CREATININE 1.47 (H) 01/31/2022 12:03 PM     No results found for: ALKPHOSPHAT, ASTSGOT, ALTSGPT, TBILIRUBIN     Transthoracic Echo Report 8/19/2021  No prior study is available for comparison.   Normal left ventricular systolic function.   No evidence of valvular abnormality " based on Doppler evaluation.           Assessment & Plan     1. Stage 3a chronic kidney disease (HCC)  Basic Metabolic Panel   2. High risk medications (not anticoagulants) long-term use  Basic Metabolic Panel   3. HTN (hypertension), malignant  Basic Metabolic Panel   4. ACC/AHA stage C heart failure with preserved ejection fraction (HCC)  Basic Metabolic Panel   5. Heart failure, NYHA class 2 (HCC)     6. Snores     7. Type 2 diabetes mellitus with hyperglycemia, without long-term current use of insulin (HCC)         Medical Decision Making: Today's Assessment/Status/Plan:         Patient encouraged to get BMP done in the next week.  She states it is more cost effective for her to get her labs done in [place].  She will take the lab slip to her local lab.    HFpEF, Stage C, Class 2, LVEF 60%: Based on physical examination findings, patient is euvolemic. No JVD, lungs are clear to auscultation, no pitting edema in bilateral lower extremities, no ascites.  -Patient has comorbid obesity, questionable sleep apnea, patient has enlarged RV per echo  -Patient to follow-up with sleep medicine.  She states she has to get the referral through her [place] clinic.  -Patient to continue to monitor her sodium intake  -Continue furosemide 40 mg daily and potassium 10 mEq daily  -Continue spironolactone 25 mg daily  -Continue empagliflozin 10 mg daily  -Reinforced s/sx of worsening heart failure with patient and weight monitoring. Pt verbalizes understanding. Pt to call office or RTC if present.     CKD stage III:  -Continue Kerendia 10 mg daily    Diabetes: Questionable  -Patient no longer on Metformin per PCP  -Continue empagliflozin 10 mg daily  -Patient encouraged to continue follow-up with PCP    Hypertension:  -Stable  -Continue metoprolol 25 mg twice a day    Snoring:  -Patient to follow-up with sleep medicine    Patient encouraged to follow-up with PCP for routine lab testing regarding her hair loss.  Suggested trying  biotin or collagen.    FU in clinic in 6 months with labs this week and in 6 months. Sooner if needed.    Patient verbalizes understanding and agrees with the plan of care.     PLEASE NOTE: This Note was created using voice recognition Software. I have made every reasonable attempt to correct obvious errors, but I expect that there are errors of grammar and possibly content that I did not discover before finalizing the note

## 2022-05-27 ENCOUNTER — TELEPHONE (OUTPATIENT)
Dept: CARDIOLOGY | Facility: MEDICAL CENTER | Age: 77
End: 2022-05-27
Payer: MEDICARE

## 2022-05-27 RX ORDER — FINERENONE 10 MG/1
10 TABLET, FILM COATED ORAL DAILY
Qty: 90 TABLET | Refills: 3 | Status: SHIPPED | OUTPATIENT
Start: 2022-05-27 | End: 2023-11-08

## 2022-05-27 NOTE — TELEPHONE ENCOUNTER
Maria De Jesus Garcia R.N.  PT called and said she is out of Finerenone (KERENDIA) 10 MG Tab [[id number]] and the pharmacy won't refill it. She expressed concern and she needs this medication and lives far from the pharmacy and has a person in town ready to pick it up if it can be filled and available today. I think she would like a call back as well.   Thanks!   [facility]       S/W pharmacy- provided new Kerendia Trial Offer Voucher   BIN [id number]  [facility]  GRP# [id number]  ID# [id number]    S/W first coupon must be used by 4/30/22    Kerendia coupons are not processing at this time. Susi ASCENCIO is reaching out to rep

## 2022-05-31 NOTE — TELEPHONE ENCOUNTER
S/W pharmacy again. Per them, 800 number did not provide any solution at this time. Stated pt has already used the voucher max number of times (2) and therefore cannot receive additional free use.     To SOFÍA, at this time, we have no solutions, want to make a change in therapy?    To Susi ASCENCIO

## 2022-06-02 NOTE — TELEPHONE ENCOUNTER
SOFÍA    Caller: Rosmeyr CECILLERadha Akhil    Medication Name and Dosage: Finerenone (KERENDIA) 10 MG Tab    Did patient contact pharmacy (If no, please call pharmacy first): Unsure of pharmacy, pt is waiting to hear back from Cardiology about a Pharmacy that is close to her that takes this medication.    Medication amount left: completely out of her medication      Preferred Pharmacy: Pt is not sure at this time and was checking in with the office on the status of this with this medication.    Other questions (Topic): Rosmery called in and wanted to check on the status of this medication refill.    Callback Number (Will only call for issues):[phone]    Thank you,  Adriane BROWNING

## 2022-06-10 ENCOUNTER — TELEPHONE (OUTPATIENT)
Dept: CARDIOLOGY | Facility: MEDICAL CENTER | Age: 77
End: 2022-06-10

## 2022-06-10 NOTE — TELEPHONE ENCOUNTER
SOFÍA      Caller: Rosmery Landaverde    Topic/issue: Patient requested a call back because she was told by her pharmacy that her insurances will no longer cover this medication:Finerenone (KERENDIA) 10 MG Tab  Shes asking for a call back to discuss because she is completely out.  Callback Number: [phone] (home)       Thank you.  Mich LEES

## 2022-06-10 NOTE — TELEPHONE ENCOUNTER
S/W pt, reviewed tele note 5/27, informed of Allen MADERA's effort at trying to get this medication covered and no alternatives at this time. Per SOFÍA, instruction pt to wait for now. Pt has no further questions at this time.

## 2022-07-25 ENCOUNTER — TELEPHONE (OUTPATIENT)
Dept: CARDIOLOGY | Facility: MEDICAL CENTER | Age: 77
End: 2022-07-25
Payer: MEDICARE

## 2022-07-25 NOTE — TELEPHONE ENCOUNTER
Attempted to contact pt to see about possible interest in [facility] for her Kerendia. Unable to LVM. VM box full.

## 2022-11-02 ENCOUNTER — TELEPHONE (OUTPATIENT)
Dept: CARDIOLOGY | Facility: MEDICAL CENTER | Age: 77
End: 2022-11-02

## 2022-11-02 ENCOUNTER — OFFICE VISIT (OUTPATIENT)
Dept: CARDIOLOGY | Facility: MEDICAL CENTER | Age: 77
End: 2022-11-02
Payer: MEDICARE

## 2022-11-02 VITALS
WEIGHT: 194 LBS | BODY MASS INDEX: 36.63 KG/M2 | HEIGHT: 61 IN | HEART RATE: 76 BPM | DIASTOLIC BLOOD PRESSURE: 70 MMHG | SYSTOLIC BLOOD PRESSURE: 110 MMHG | OXYGEN SATURATION: 92 % | RESPIRATION RATE: 16 BRPM

## 2022-11-02 DIAGNOSIS — I10 HTN (HYPERTENSION), MALIGNANT: ICD-10-CM

## 2022-11-02 DIAGNOSIS — N18.31 STAGE 3A CHRONIC KIDNEY DISEASE: ICD-10-CM

## 2022-11-02 DIAGNOSIS — Z79.899 HIGH RISK MEDICATIONS (NOT ANTICOAGULANTS) LONG-TERM USE: ICD-10-CM

## 2022-11-02 DIAGNOSIS — I50.9 HEART FAILURE, NYHA CLASS 2 (HCC): ICD-10-CM

## 2022-11-02 DIAGNOSIS — R06.83 SNORES: ICD-10-CM

## 2022-11-02 DIAGNOSIS — I50.30 ACC/AHA STAGE C HEART FAILURE WITH PRESERVED EJECTION FRACTION (HCC): ICD-10-CM

## 2022-11-02 DIAGNOSIS — E11.65 TYPE 2 DIABETES MELLITUS WITH HYPERGLYCEMIA, WITHOUT LONG-TERM CURRENT USE OF INSULIN (HCC): ICD-10-CM

## 2022-11-02 PROCEDURE — 99214 OFFICE O/P EST MOD 30 MIN: CPT | Performed by: NURSE PRACTITIONER

## 2022-11-02 ASSESSMENT — MINNESOTA LIVING WITH HEART FAILURE QUESTIONNAIRE (MLHF)
TOTAL_SCORE: 56
MAKING YOU WORRY: 3
DIFFICULTY TO CONCENTRATE OR REMEMBERING THINGS: 3
EATING LESS FOODS YOU LIKE: 0
DIFFICULTY SLEEPING WELL AT NIGHT: 3
WALKING ABOUT OR CLIMBING STAIRS DIFFICULT: 3
DIFFICULTY WORKING TO EARN A LIVING: 5
WORKING AROUND THE HOUSE OR YARD DIFFICULT: 4
DIFFICULTY WITH SEXUAL ACTIVITIES: 0
DIFFICULTY WITH RECREATIONAL PASTIMES, SPORTS, HOBBIES: 5
TIRED, FATIGUED OR LOW ON ENERGY: 4
DIFFICULTY GOING AWAY FROM HOME: 3
FEELING LIKE A BURDEN TO FAMILY AND FRIENDS: 3
SWELLING IN ANKLES OR LEGS: 0
COSTING YOU MONEY FOR MEDICAL CARE: 3
LOSS OF SELF CONTROL IN YOUR LIFE: 3
HAVING TO SIT OR LIE DOWN DURING THE DAY: 4
MAKING YOU FEEL DEPRESSED: 2
GIVING YOU SIDE EFFECTS FROM TREATMENTS: 0
MAKING YOU STAY IN A HOSPITAL: 0
MAKING YOU SHORT OF BREATH: 4
DIFFICULTY SOCIALIZING WITH FAMILY OR FRIENDS: 4

## 2022-11-02 ASSESSMENT — ENCOUNTER SYMPTOMS
ABDOMINAL PAIN: 0
COUGH: 0
ORTHOPNEA: 0
MYALGIAS: 0
FEVER: 0
DIZZINESS: 1
CLAUDICATION: 0
PALPITATIONS: 0
PND: 0
HEADACHES: 0
SHORTNESS OF BREATH: 1

## 2022-11-02 NOTE — PROGRESS NOTES
Chief Complaint   Patient presents with    Chronic Kidney Disease     F/v dx: Stage 3a chronic kidney disease (HCC)    Palpitations    Edema       Subjective     Rosmery Landaverde is a 77 y.o. female who presents today for follow-up on her heart failure with her daughter, Amanda.     Patient Dr. Soto.  She was last seen in clinic on 5/2/2022.  During that visit, patient was recommended to get lab testing done.  She was also recommended to complete her sleep study.    Patient reports she has been doing okay since her last visit.  She does report having some balance problems especially if she moves quickly.  She otherwise does have shortness of breath with exertion and fatigue.  She denies chest pain, palpitations, orthopnea, PND or edema.    She reports she is not weighing herself regularly at home.    She believes today she is not taking spironolactone, but may have recently been restarted on medication.  She will let us know    She is trying to watch her sodium intake.    She reports compliance with her medications.  She has been able to sort out the cost of her Kerendia.    She did not get her sleep study done.     Patient reports questionable history of diabetes.  She states her primary care recently discontinued her Metformin for unknown reasons.    Patient lives in [place], [place].    History reviewed. No pertinent past medical history.  History reviewed. No pertinent surgical history.  History reviewed. No pertinent family history.  Social History     Socioeconomic History    Marital status: Single     Spouse name: Not on file    Number of children: Not on file    Years of education: Not on file    Highest education level: Not on file   Occupational History    Not on file   Tobacco Use    Smoking status: Some Days     Packs/day: 0.50     Types: Cigarettes     Start date: 6/10/1962    Smokeless tobacco: Never    Tobacco comments:     smokes 1/2 Pack on some day of the week, someday no smoking   Vaping Use     Vaping Use: Never used   Substance and Sexual Activity    Alcohol use: Yes     Alcohol/week: 2.4 oz     Types: 4 Cans of beer per week     Comment: few beers a couple days per week      Drug use: Never    Sexual activity: Not on file   Other Topics Concern    Not on file   Social History Narrative    Not on file     Social Determinants of Health     Financial Resource Strain: Not on file   Food Insecurity: Not on file   Transportation Needs: Not on file   Physical Activity: Not on file   Stress: Not on file   Social Connections: Not on file   Intimate Partner Violence: Not on file   Housing Stability: Not on file     Allergies   Allergen Reactions    Morphine      Severe reaction      Outpatient Encounter Medications as of 11/2/2022   Medication Sig Dispense Refill    Finerenone (KERENDIA) 10 MG Tab Take 10 mg by mouth every day. 90 Tablet 3    spironolactone (ALDACTONE) 25 MG Tab Take 25 mg by mouth every day.      potassium chloride ER (KLOR-CON) 10 MEQ tablet Take 1 Tablet by mouth every day. 90 Tablet 3    Empagliflozin 10 MG Tab Take  by mouth.      vitamin D2, Ergocalciferol, (DRISDOL) 1.25 MG (09079 UT) Cap capsule Take  by mouth every 7 days.      docusate sodium (COLACE) 100 MG Cap Take 100 mg by mouth 2 times a day.      metoprolol tartrate (LOPRESSOR) 25 MG Tab Take 25 mg by mouth 2 times a day.      buPROPion SR (WELLBUTRIN-SR) 150 MG TABLET SR 12 HR sustained-release tablet Take 150 mg by mouth 2 times a day.      furosemide (LASIX) 40 MG Tab Take 1 tablet by mouth every day. 30 tablet 11    [DISCONTINUED] Finerenone (KERENDIA) 10 MG Tab Take 10 mg by mouth every day. 90 Tablet 3     No facility-administered encounter medications on file as of 11/2/2022.     Review of Systems   Constitutional:  Positive for malaise/fatigue. Negative for fever.   Respiratory:  Positive for shortness of breath (with exertion). Negative for cough.    Cardiovascular:  Negative for chest pain, palpitations, orthopnea,  "claudication, leg swelling and PND.   Gastrointestinal:  Negative for abdominal pain.   Musculoskeletal:  Negative for myalgias.   Neurological:  Positive for dizziness (occ). Negative for headaches.   All other systems reviewed and are negative.           Objective     /70 (BP Location: Left arm, Patient Position: Sitting, BP Cuff Size: Adult)   Pulse 76   Resp 16   Ht 1.549 m (5' 1\")   Wt 88 kg (194 lb)   SpO2 92%   BMI 36.66 kg/m²     Physical Exam  Vitals reviewed.   Constitutional:       Appearance: She is well-developed. She is obese.   HENT:      Head: Normocephalic and atraumatic.   Eyes:      Pupils: Pupils are equal, round, and reactive to light.   Neck:      Vascular: No JVD.   Cardiovascular:      Rate and Rhythm: Normal rate and regular rhythm.      Heart sounds: Normal heart sounds.   Pulmonary:      Effort: Pulmonary effort is normal. No respiratory distress.      Breath sounds: Normal breath sounds. No wheezing or rales.   Abdominal:      General: Bowel sounds are normal.      Palpations: Abdomen is soft.   Musculoskeletal:         General: Normal range of motion.      Cervical back: Normal range of motion and neck supple.      Right lower leg: Edema (trace) present.      Left lower leg: Edema (trace) present.   Skin:     General: Skin is warm and dry.   Neurological:      General: No focal deficit present.      Mental Status: She is alert and oriented to person, place, and time.   Psychiatric:         Behavior: Behavior normal.     No results found for: CHOLSTRLTOT, LDL, HDL, TRIGLYCERIDE    Lab Results   Component Value Date/Time    SODIUM 140 01/31/2022 12:03 PM    POTASSIUM 3.0 (L) 01/31/2022 12:03 PM    CHLORIDE 96 01/31/2022 12:03 PM    CO2 28 01/31/2022 12:03 PM    GLUCOSE 102 (H) 01/31/2022 12:03 PM    BUN 13 01/31/2022 12:03 PM    CREATININE 1.47 (H) 01/31/2022 12:03 PM     No results found for: ALKPHOSPHAT, ASTSGOT, ALTSGPT, TBILIRUBIN     Transthoracic Echo Report 8/19/2021  No " prior study is available for comparison.   Normal left ventricular systolic function.   No evidence of valvular abnormality based on Doppler evaluation.           Assessment & Plan     1. ACC/AHA stage C heart failure with preserved ejection fraction (HCC)  Referral to Pulmonary and Sleep Medicine    Comp Metabolic Panel    Lipid Profile      2. Heart failure, NYHA class 2 (HCC)  Referral to Pulmonary and Sleep Medicine    Comp Metabolic Panel    Lipid Profile      3. HTN (hypertension), malignant        4. High risk medications (not anticoagulants) long-term use        5. Stage 3a chronic kidney disease (HCC)        6. Type 2 diabetes mellitus with hyperglycemia, without long-term current use of insulin (HCC)        7. Snores            Medical Decision Making: Today's Assessment/Status/Plan:         Will request lab results that she did last week    Patient also to verify if she is taking spironolactone and notify our office.    HFpEF, Stage C, Class 2, LVEF 60%: Based on physical examination findings, patient is euvolemic. No JVD, lungs are clear to auscultation, no pitting edema in bilateral lower extremities, no ascites.  -Patient has comorbid obesity, questionable sleep apnea, patient has enlarged RV per echo  -Sleeps study referral reordered.  Patient hoping to get done in [place], or schedule  -Patient to continue to monitor her sodium intake  -Continue furosemide 40 mg daily and potassium 10 mEq daily  -Continue spironolactone 25 mg daily???  Patient to verify  -Continue empagliflozin 10 mg daily  -Reinforced s/sx of worsening heart failure with patient and weight monitoring. Pt verbalizes understanding. Pt to call office or RTC if present.     CKD stage III:  -Continue Kerendia 10 mg daily    Diabetes: Questionable  -Patient no longer on Metformin per PCP  -Continue empagliflozin 10 mg daily  -Patient encouraged to continue follow-up with PCP    Hypertension:  -Stable  -Continue metoprolol 25 mg twice a  day    Snoring:  -Patient to follow-up with sleep medicine, new referral placed    CMP and lipid panel ordered for 6 months    FU in clinic in 6 months with labs. Sooner if needed.    Patient verbalizes understanding and agrees with the plan of care.     PLEASE NOTE: This Note was created using voice recognition Software. I have made every reasonable attempt to correct obvious errors, but I expect that there are errors of grammar and possibly content that I did not discover before finalizing the note

## 2023-04-28 ENCOUNTER — TELEPHONE (OUTPATIENT)
Dept: CARDIOLOGY | Facility: MEDICAL CENTER | Age: 78
End: 2023-04-28
Payer: MEDICARE

## 2023-04-28 NOTE — TELEPHONE ENCOUNTER
05/11/2023    Received records from [facility] records scan in.      LVM for pt in regards to lab work that was ordered at previous OV with LENIN Knowles. Office number given for call back if pt has any questions or has had lab work done outside of [facility]. Pt has follow up appointment scheduled with LENIN Knowles on.

## 2023-05-08 ENCOUNTER — OFFICE VISIT (OUTPATIENT)
Dept: CARDIOLOGY | Facility: MEDICAL CENTER | Age: 78
End: 2023-05-08
Payer: MEDICARE

## 2023-05-08 VITALS
WEIGHT: 188 LBS | BODY MASS INDEX: 35.5 KG/M2 | RESPIRATION RATE: 13 BRPM | DIASTOLIC BLOOD PRESSURE: 66 MMHG | OXYGEN SATURATION: 91 % | HEART RATE: 103 BPM | SYSTOLIC BLOOD PRESSURE: 110 MMHG | HEIGHT: 61 IN

## 2023-05-08 DIAGNOSIS — I10 HTN (HYPERTENSION), MALIGNANT: ICD-10-CM

## 2023-05-08 DIAGNOSIS — I50.30 ACC/AHA STAGE C HEART FAILURE WITH PRESERVED EJECTION FRACTION (HCC): ICD-10-CM

## 2023-05-08 DIAGNOSIS — N18.32 STAGE 3B CHRONIC KIDNEY DISEASE (CKD): ICD-10-CM

## 2023-05-08 DIAGNOSIS — Z79.899 HIGH RISK MEDICATIONS (NOT ANTICOAGULANTS) LONG-TERM USE: ICD-10-CM

## 2023-05-08 DIAGNOSIS — I50.9 HEART FAILURE, NYHA CLASS 2 (HCC): ICD-10-CM

## 2023-05-08 PROCEDURE — 99214 OFFICE O/P EST MOD 30 MIN: CPT | Performed by: NURSE PRACTITIONER

## 2023-05-08 PROCEDURE — 93000 ELECTROCARDIOGRAM COMPLETE: CPT | Performed by: INTERNAL MEDICINE

## 2023-05-08 ASSESSMENT — ENCOUNTER SYMPTOMS
PND: 0
CLAUDICATION: 0
ABDOMINAL PAIN: 0
FEVER: 0
HEADACHES: 0
SHORTNESS OF BREATH: 1
ORTHOPNEA: 0
MYALGIAS: 0
DIZZINESS: 0
PALPITATIONS: 0
COUGH: 0

## 2023-05-08 ASSESSMENT — MINNESOTA LIVING WITH HEART FAILURE QUESTIONNAIRE (MLHF)
HAVING TO SIT OR LIE DOWN DURING THE DAY: 4
MAKING YOU WORRY: 3
TOTAL_SCORE: 61
DIFFICULTY TO CONCENTRATE OR REMEMBERING THINGS: 4
MAKING YOU FEEL DEPRESSED: 3
DIFFICULTY SOCIALIZING WITH FAMILY OR FRIENDS: 4
SWELLING IN ANKLES OR LEGS: 3
FEELING LIKE A BURDEN TO FAMILY AND FRIENDS: 4
MAKING YOU STAY IN A HOSPITAL: 0
WORKING AROUND THE HOUSE OR YARD DIFFICULT: 4
DIFFICULTY SLEEPING WELL AT NIGHT: 4
LOSS OF SELF CONTROL IN YOUR LIFE: 4
DIFFICULTY WITH SEXUAL ACTIVITIES: 0
EATING LESS FOODS YOU LIKE: 3
DIFFICULTY WITH RECREATIONAL PASTIMES, SPORTS, HOBBIES: 3
COSTING YOU MONEY FOR MEDICAL CARE: 0
TIRED, FATIGUED OR LOW ON ENERGY: 5
WALKING ABOUT OR CLIMBING STAIRS DIFFICULT: 4
MAKING YOU SHORT OF BREATH: 5
DIFFICULTY WORKING TO EARN A LIVING: 0
DIFFICULTY GOING AWAY FROM HOME: 4
GIVING YOU SIDE EFFECTS FROM TREATMENTS: 0

## 2023-05-08 NOTE — PROGRESS NOTES
Chief Complaint   Patient presents with    Congestive Heart Failure       Subjective     Rosmery Landaverde is a 78 y.o. female who presents today for follow-up on her heart failure with her daughter, Evon.     Patient Dr. Soto.  She was last seen in clinic on 11/2/2022.  During that visit, patient to verify some of her medications.    She reports her provider in [facility] at the [facility] wellness center wanting to provide list of medications that patient should be potentially taking.    Patient states Cadence was supposed to send over her active list from the clinic to verify as well.    Patient reports she has been doing well, continues to have shortness of breath, fatigue.    Patient feels well, denies chest pain, palpitations, dizziness/lightheadedness, orthopnea, PND or Edema.      She reports she is planning on getting additional labs done for her PCP in 2 weeks.    Patient reports questionable history of diabetes.  She states her primary care previously discontinued her Metformin for unknown reasons.    Patient lives in [place], [place].    History reviewed. No pertinent past medical history.  History reviewed. No pertinent surgical history.  History reviewed. No pertinent family history.  Social History     Socioeconomic History    Marital status: Single     Spouse name: Not on file    Number of children: Not on file    Years of education: Not on file    Highest education level: Not on file   Occupational History    Not on file   Tobacco Use    Smoking status: Some Days     Packs/day: 0.50     Types: Cigarettes     Start date: 6/10/1962    Smokeless tobacco: Never    Tobacco comments:     smokes 1/2 Pack on some day of the week, someday no smoking   Vaping Use    Vaping Use: Never used   Substance and Sexual Activity    Alcohol use: Yes     Alcohol/week: 2.4 oz     Types: 4 Cans of beer per week     Comment: few beers a couple days per week      Drug use: Never    Sexual activity: Not on file   Other Topics  Concern    Not on file   Social History Narrative    Not on file     Social Determinants of Health     Financial Resource Strain: Not on file   Food Insecurity: Not on file   Transportation Needs: Not on file   Physical Activity: Not on file   Stress: Not on file   Social Connections: Not on file   Intimate Partner Violence: Not on file   Housing Stability: Not on file     Allergies   Allergen Reactions    Morphine      Severe reaction      Outpatient Encounter Medications as of 5/8/2023   Medication Sig Dispense Refill    potassium chloride ER (KLOR-CON) 10 MEQ tablet Take 1 Tablet by mouth every day. 90 Tablet 3    Empagliflozin 10 MG Tab Take 10 mg by mouth every day.      vitamin D2, Ergocalciferol, (DRISDOL) 1.25 MG (39267 UT) Cap capsule Take  by mouth every 7 days.      docusate sodium (COLACE) 100 MG Cap Take 100 mg by mouth 2 times a day.      buPROPion SR (WELLBUTRIN-SR) 150 MG TABLET SR 12 HR sustained-release tablet Take 150 mg by mouth 2 times a day.      furosemide (LASIX) 40 MG Tab Take 1 tablet by mouth every day. 30 tablet 11    Finerenone (KERENDIA) 10 MG Tab Take 10 mg by mouth every day. (Patient not taking: Reported on 5/8/2023) 90 Tablet 3    spironolactone (ALDACTONE) 25 MG Tab Take 25 mg by mouth every day.      metoprolol tartrate (LOPRESSOR) 25 MG Tab Take 25 mg by mouth 2 times a day. (Patient not taking: Reported on 5/8/2023)       No facility-administered encounter medications on file as of 5/8/2023.     Review of Systems   Constitutional:  Positive for malaise/fatigue. Negative for fever.   Respiratory:  Positive for shortness of breath (with exertion). Negative for cough.    Cardiovascular:  Negative for chest pain, palpitations, orthopnea, claudication, leg swelling and PND.   Gastrointestinal:  Negative for abdominal pain.   Musculoskeletal:  Negative for myalgias.   Neurological:  Negative for dizziness and headaches.   All other systems reviewed and are negative.      "      Objective     /66 (BP Location: Left arm, Patient Position: Sitting, BP Cuff Size: Adult)   Pulse (!) 103   Resp 13   Ht 1.549 m (5' 1\")   Wt 85.3 kg (188 lb)   SpO2 91%   BMI 35.52 kg/m²     Physical Exam  Vitals reviewed.   Constitutional:       Appearance: She is well-developed. She is obese.   HENT:      Head: Normocephalic and atraumatic.   Eyes:      Pupils: Pupils are equal, round, and reactive to light.   Neck:      Vascular: No JVD.   Cardiovascular:      Rate and Rhythm: Normal rate and regular rhythm.      Heart sounds: Normal heart sounds.   Pulmonary:      Effort: Pulmonary effort is normal. No respiratory distress.      Breath sounds: Normal breath sounds. No wheezing or rales.   Abdominal:      General: Bowel sounds are normal.      Palpations: Abdomen is soft.   Musculoskeletal:         General: Normal range of motion.      Cervical back: Normal range of motion and neck supple.      Right lower leg: Edema (trace) present.      Left lower leg: Edema (trace) present.   Skin:     General: Skin is warm and dry.   Neurological:      General: No focal deficit present.      Mental Status: She is alert and oriented to person, place, and time.   Psychiatric:         Behavior: Behavior normal.     No results found for: CHOLSTRLTOT, LDL, HDL, TRIGLYCERIDE    Lab Results   Component Value Date/Time    SODIUM 140 01/31/2022 12:03 PM    POTASSIUM 3.0 (L) 01/31/2022 12:03 PM    CHLORIDE 96 01/31/2022 12:03 PM    CO2 28 01/31/2022 12:03 PM    GLUCOSE 102 (H) 01/31/2022 12:03 PM    BUN 13 01/31/2022 12:03 PM    CREATININE 1.47 (H) 01/31/2022 12:03 PM     No results found for: ALKPHOSPHAT, ASTSGOT, ALTSGPT, TBILIRUBIN     Transthoracic Echo Report 8/19/2021  No prior study is available for comparison.   Normal left ventricular systolic function.   No evidence of valvular abnormality based on Doppler evaluation.           Assessment & Plan     1. ACC/AHA stage C heart failure with preserved ejection " fraction (HCC)        2. HTN (hypertension), malignant  EKG      3. Heart failure, NYHA class 2 (HCC)        4. High risk medications (not anticoagulants) long-term use        5. Stage 3a chronic kidney disease (HCC)        6. Type 2 diabetes mellitus with hyperglycemia, without long-term current use of insulin (HCC)            Medical Decision Making: Today's Assessment/Status/Plan:         We will request medication list from [facility]ence at [facility], extension 105    HFpEF, Stage C, Class 2, LVEF 60%: Based on physical examination findings, patient is euvolemic. No JVD, lungs are clear to auscultation, no pitting edema in bilateral lower extremities, no ascites.  -Patient has comorbid obesity, questionable sleep apnea, patient has enlarged RV per echo  -At this time, patient is stable  -Unsure if patient saw sleep study yet  -Patient to continue to monitor her sodium intake  -Continue furosemide 40 mg daily and potassium 10 mEq daily  -Patient should be off of spironolactone as she is taking Kerendia  -Continue empagliflozin 10 mg daily  -Reinforced s/sx of worsening heart failure with patient and weight monitoring. Pt verbalizes understanding. Pt to call office or RTC if present.     CKD stage III:  -She believes she is still taking Kerendia 10 mg daily, she will verify, discussed that she should not be on spironolactone if taking Kerendia    Diabetes: Questionable  -Patient no longer on Metformin per PCP  -Continue empagliflozin 10 mg daily  -Patient encouraged to continue follow-up with PCP    Hypertension:  -Stable  -Continue metoprolol 25 mg twice a day???  Patient will verify if she is taking this    Snoring:  -She was previously referred over to sleep medicine    Patient is planning on getting labs done at the [facility] in a couple weeks.  She states she will try to forward a copy to our office.    FU in clinic in 6 months with labs. Sooner if needed.    Patient verbalizes  understanding and agrees with the plan of care.     PLEASE NOTE: This Note was created using voice recognition Software. I have made every reasonable attempt to correct obvious errors, but I expect that there are errors of grammar and possibly content that I did not discover before finalizing the note

## 2023-05-09 LAB — EKG IMPRESSION: NORMAL

## 2023-08-08 ENCOUNTER — TELEPHONE (OUTPATIENT)
Dept: HEALTH INFORMATION MANAGEMENT | Facility: OTHER | Age: 78
End: 2023-08-08
Payer: MEDICARE

## 2023-11-08 ENCOUNTER — OFFICE VISIT (OUTPATIENT)
Dept: CARDIOLOGY | Facility: MEDICAL CENTER | Age: 78
End: 2023-11-08
Attending: NURSE PRACTITIONER
Payer: MEDICARE

## 2023-11-08 VITALS
BODY MASS INDEX: 33.04 KG/M2 | HEART RATE: 76 BPM | DIASTOLIC BLOOD PRESSURE: 70 MMHG | OXYGEN SATURATION: 98 % | SYSTOLIC BLOOD PRESSURE: 102 MMHG | HEIGHT: 61 IN | RESPIRATION RATE: 16 BRPM | WEIGHT: 175 LBS

## 2023-11-08 DIAGNOSIS — I50.9 HEART FAILURE, NYHA CLASS 2 (HCC): ICD-10-CM

## 2023-11-08 DIAGNOSIS — E11.65 TYPE 2 DIABETES MELLITUS WITH HYPERGLYCEMIA, WITHOUT LONG-TERM CURRENT USE OF INSULIN (HCC): ICD-10-CM

## 2023-11-08 DIAGNOSIS — Z79.899 HIGH RISK MEDICATIONS (NOT ANTICOAGULANTS) LONG-TERM USE: ICD-10-CM

## 2023-11-08 DIAGNOSIS — N18.32 STAGE 3B CHRONIC KIDNEY DISEASE (CKD): ICD-10-CM

## 2023-11-08 DIAGNOSIS — R06.83 SNORES: ICD-10-CM

## 2023-11-08 DIAGNOSIS — I10 HTN (HYPERTENSION), MALIGNANT: ICD-10-CM

## 2023-11-08 DIAGNOSIS — Z79.899 HIGH RISK MEDICATION USE: ICD-10-CM

## 2023-11-08 DIAGNOSIS — I50.30 ACC/AHA STAGE C HEART FAILURE WITH PRESERVED EJECTION FRACTION (HCC): ICD-10-CM

## 2023-11-08 PROCEDURE — 3074F SYST BP LT 130 MM HG: CPT | Performed by: NURSE PRACTITIONER

## 2023-11-08 PROCEDURE — 99213 OFFICE O/P EST LOW 20 MIN: CPT | Performed by: NURSE PRACTITIONER

## 2023-11-08 PROCEDURE — 99212 OFFICE O/P EST SF 10 MIN: CPT | Performed by: NURSE PRACTITIONER

## 2023-11-08 PROCEDURE — 99214 OFFICE O/P EST MOD 30 MIN: CPT | Performed by: NURSE PRACTITIONER

## 2023-11-08 PROCEDURE — 3078F DIAST BP <80 MM HG: CPT | Performed by: NURSE PRACTITIONER

## 2023-11-08 RX ORDER — GLIPIZIDE 5 MG/1
5 TABLET ORAL DAILY
COMMUNITY

## 2023-11-08 ASSESSMENT — ENCOUNTER SYMPTOMS
HEADACHES: 0
ABDOMINAL PAIN: 0
MYALGIAS: 0
PND: 0
SHORTNESS OF BREATH: 1
CLAUDICATION: 0
ORTHOPNEA: 0
DIZZINESS: 0
PALPITATIONS: 0
COUGH: 0
FEVER: 0

## 2023-11-08 NOTE — PROGRESS NOTES
Chief Complaint   Patient presents with    Congestive Heart Failure     F/V DX: ACC/AHA stage C heart failure with preserved ejection fraction (HCC)        Subjective     Rosmery Landaverde is a 78 y.o. female who presents today for follow-up on her heart failure with her daughter, Evon.     Patient Dr. Soto.  She was last seen in clinic on 5/8/2023.  During that visit, patient to verify some of her medications and current lab results were pending from her PCP clinic.    Patient reports she has been doing well since her last visit, she does mention having some incontinence.  Her PCP treated her for yeast infection.  She mentions she did stop her furosemide and spironolactone for couple weeks due to his incontinence.    She otherwise mentions minimal lower extremity edema and continued shortness of breath.  She denies chest pain, palpitations, orthopnea, PND or dizziness/lightheadedness.    She is not weighing herself at home regularly.    She did get labs done at her PCP office last week.    Patient did bring in her medications with her today    Patient reports questionable history of diabetes.  She states her primary care previously discontinued her Metformin for unknown reasons.    Patient lives in [place], [place].    History reviewed. No pertinent past medical history.  History reviewed. No pertinent surgical history.  History reviewed. No pertinent family history.  Social History     Socioeconomic History    Marital status: Single     Spouse name: Not on file    Number of children: Not on file    Years of education: Not on file    Highest education level: Not on file   Occupational History    Not on file   Tobacco Use    Smoking status: Some Days     Current packs/day: 0.50     Average packs/day: 0.5 packs/day for 61.4 years (30.7 ttl pk-yrs)     Types: Cigarettes     Start date: 6/10/1962    Smokeless tobacco: Never    Tobacco comments:     smokes 1/2 Pack on some day of the week, someday no smoking   Vaping  Use    Vaping Use: Never used   Substance and Sexual Activity    Alcohol use: Yes     Alcohol/week: 2.4 oz     Types: 4 Cans of beer per week     Comment: few beers a couple days per week      Drug use: Never    Sexual activity: Not on file   Other Topics Concern    Not on file   Social History Narrative    Not on file     Social Determinants of Health     Financial Resource Strain: Not on file   Food Insecurity: Not on file   Transportation Needs: Not on file   Physical Activity: Not on file   Stress: Not on file   Social Connections: Not on file   Intimate Partner Violence: Not on file   Housing Stability: Not on file     Allergies   Allergen Reactions    Morphine      Severe reaction      Outpatient Encounter Medications as of 11/8/2023   Medication Sig Dispense Refill    glipiZIDE (GLUCOTROL) 5 MG Tab Take 5 mg by mouth every day.      Carboxymethylcell-Glycerin PF 0.5-0.9 % Solution Administer  into affected eye(s) as needed.      spironolactone (ALDACTONE) 25 MG Tab Take 25 mg by mouth every day.      potassium chloride ER (KLOR-CON) 10 MEQ tablet Take 1 Tablet by mouth every day. 90 Tablet 3    Empagliflozin 10 MG Tab Take 25 mg by mouth every day.      vitamin D2, Ergocalciferol, (DRISDOL) 1.25 MG (83038 UT) Cap capsule Take  by mouth every 7 days.      docusate sodium (COLACE) 100 MG Cap Take 100 mg by mouth 2 times a day.      metoprolol tartrate (LOPRESSOR) 25 MG Tab Take 50 mg by mouth 2 times a day.      buPROPion SR (WELLBUTRIN-SR) 150 MG TABLET SR 12 HR sustained-release tablet Take 150 mg by mouth 2 times a day.      furosemide (LASIX) 40 MG Tab Take 1 tablet by mouth every day. 30 tablet 11    [DISCONTINUED] Finerenone (KERENDIA) 10 MG Tab Take 10 mg by mouth every day. (Patient not taking: Reported on 5/8/2023) 90 Tablet 3     No facility-administered encounter medications on file as of 11/8/2023.     Review of Systems   Constitutional:  Positive for malaise/fatigue. Negative for fever.  "  Respiratory:  Positive for shortness of breath (with exertion). Negative for cough.    Cardiovascular:  Positive for leg swelling (little bit). Negative for chest pain, palpitations, orthopnea, claudication and PND.   Gastrointestinal:  Negative for abdominal pain.   Musculoskeletal:  Negative for myalgias.   Neurological:  Negative for dizziness and headaches.   All other systems reviewed and are negative.             Objective     /70 (BP Location: Left arm, Patient Position: Sitting, BP Cuff Size: Adult)   Pulse 76   Resp 16   Ht 1.549 m (5' 1\")   Wt 79.4 kg (175 lb)   SpO2 98%   BMI 33.07 kg/m²     Physical Exam  Vitals reviewed.   Constitutional:       Appearance: She is well-developed. She is obese.   HENT:      Head: Normocephalic and atraumatic.   Eyes:      Pupils: Pupils are equal, round, and reactive to light.   Neck:      Vascular: No JVD.   Cardiovascular:      Rate and Rhythm: Normal rate and regular rhythm.      Heart sounds: Normal heart sounds.   Pulmonary:      Effort: Pulmonary effort is normal. No respiratory distress.      Breath sounds: Normal breath sounds. No wheezing or rales.   Abdominal:      General: Bowel sounds are normal.      Palpations: Abdomen is soft.   Musculoskeletal:         General: Normal range of motion.      Cervical back: Normal range of motion and neck supple.      Right lower leg: Edema (trace) present.      Left lower leg: Edema (trace) present.   Skin:     General: Skin is warm and dry.   Neurological:      General: No focal deficit present.      Mental Status: She is alert and oriented to person, place, and time.   Psychiatric:         Behavior: Behavior normal.       No results found for: \"CHOLSTRLTOT\", \"LDL\", \"HDL\", \"TRIGLYCERIDE\"    Lab Results   Component Value Date/Time    SODIUM 140 01/31/2022 12:03 PM    POTASSIUM 3.0 (L) 01/31/2022 12:03 PM    CHLORIDE 96 01/31/2022 12:03 PM    CO2 28 01/31/2022 12:03 PM    GLUCOSE 102 (H) 01/31/2022 12:03 PM    " "BUN 13 01/31/2022 12:03 PM    CREATININE 1.47 (H) 01/31/2022 12:03 PM     No results found for: \"ALKPHOSPHAT\", \"ASTSGOT\", \"ALTSGPT\", \"TBILIRUBIN\"     Transthoracic Echo Report 8/19/2021  No prior study is available for comparison.   Normal left ventricular systolic function.   No evidence of valvular abnormality based on Doppler evaluation.           Assessment & Plan     1. ACC/AHA stage C heart failure with preserved ejection fraction (HCC)  Basic Metabolic Panel      2. HTN (hypertension), malignant  Basic Metabolic Panel      3. High risk medication use  Basic Metabolic Panel      4. Heart failure, NYHA class 2 (HCC)        5. High risk medications (not anticoagulants) long-term use        6. Stage 3b chronic kidney disease (CKD) (HCC)        7. Type 2 diabetes mellitus with hyperglycemia, without long-term current use of insulin (HCC)        8. Snores            Medical Decision Making: Today's Assessment/Status/Plan:         HFpEF, Stage C, Class 2, LVEF 60%: Based on physical examination findings, patient is euvolemic. No JVD, lungs are clear to auscultation, no pitting edema in bilateral lower extremities, no ascites.  -Patient has comorbid obesity, questionable sleep apnea, patient has enlarged RV per echo  -At this time, patient is stable  -Patient does not want to pursue sleep study  -Recommend low-sodium diet  -Okay to stay off furosemide, but take 40 mg daily PRN if swelling worsens  -Recommend restarting spironolactone 25 mg daily (patient not able to afford Kerendia)  -Continue empagliflozin 25 mg daily  -Continue metoprolol tartrate 50 mg twice a day  -Continue potassium 10 mEq daily  -BMP due in 1 week after restarting spironolactone  -Reinforced s/sx of worsening heart failure with patient and weight monitoring. Pt verbalizes understanding. Pt to call office or RTC if present.     CKD stage III:  -Patient no longer taking hernia, not able to afford    Diabetes: Questionable  -Glipizide on her list " from her clinic, but patient unsure if she is actually taking, it is not included in her medications today  -Patient no longer on Metformin per PCP  -Continue empagliflozin 25 mg daily  -Patient encouraged to continue follow-up with PCP    Hypertension:  -Stable  -Continue metoprolol 50 mg twice a day    Snoring:  -She was previously referred over to sleep medicine, last referral has  currently not interested in new referral     FU in clinic in 6 months with labs. Sooner if needed.    Patient verbalizes understanding and agrees with the plan of care.     PLEASE NOTE: This Note was created using voice recognition Software. I have made every reasonable attempt to correct obvious errors, but I expect that there are errors of grammar and possibly content that I did not discover before finalizing the note

## 2023-11-17 DIAGNOSIS — I10 HTN (HYPERTENSION), MALIGNANT: ICD-10-CM

## 2023-11-17 DIAGNOSIS — I50.30 ACC/AHA STAGE C HEART FAILURE WITH PRESERVED EJECTION FRACTION (HCC): ICD-10-CM

## 2023-11-17 DIAGNOSIS — Z79.899 HIGH RISK MEDICATION USE: ICD-10-CM

## 2024-05-13 ENCOUNTER — APPOINTMENT (OUTPATIENT)
Dept: CARDIOLOGY | Facility: MEDICAL CENTER | Age: 79
End: 2024-05-13
Attending: NURSE PRACTITIONER
Payer: MEDICARE

## 2024-05-13 ENCOUNTER — TELEPHONE (OUTPATIENT)
Dept: CARDIOLOGY | Facility: MEDICAL CENTER | Age: 79
End: 2024-05-13
Payer: MEDICARE

## 2024-05-13 DIAGNOSIS — I50.30 ACC/AHA STAGE C HEART FAILURE WITH PRESERVED EJECTION FRACTION (HCC): ICD-10-CM

## 2024-05-13 NOTE — TELEPHONE ENCOUNTER
Placed lab orders.    Called and spoke with patient.      Please request lab results from Cadence Loera, Fax: [phone]  [facility]

## 2024-05-13 NOTE — TELEPHONE ENCOUNTER
Per RN Luz ALEXIS had fax lab results to be fax to our office.  [facility]  Phone: [phone]    Fax: [phone]    Fax confirmation has been received and scanned in.

## 2024-05-13 NOTE — TELEPHONE ENCOUNTER
----- Message from Wali Beltran Ass't sent at 2024  8:02 AM PDT -----  Regarding: FW: After Hours Call    ----- Message -----  From: Charmaine Merida  Sent: 2024   7:39 AM PDT  To: Joyce Guillen  Subject: After Hours Call                                 Patient Name: Rosmery Landaverde    Caller Name: Rosmery    Reason for Call: Patient has questions about her upcoming appointment and wants to know if she needs blood work or anything before hand. She also gave her primary doctor's info to contact for any previos lab results: Dr. Cadence [phone]     Phone Number: [phone]    Cardio Provider: Shamika Knowles    : 43    MRN: [id number]    Advised 1-2 business days for call back: Yes

## 2024-05-31 NOTE — TELEPHONE ENCOUNTER
05/31/2024    Received recent labs from [facility] in [place] Office labs have been scanned into patient chart.

## 2024-07-01 ENCOUNTER — OFFICE VISIT (OUTPATIENT)
Dept: CARDIOLOGY | Facility: MEDICAL CENTER | Age: 79
End: 2024-07-01
Attending: NURSE PRACTITIONER
Payer: MEDICARE

## 2024-07-01 VITALS
OXYGEN SATURATION: 92 % | SYSTOLIC BLOOD PRESSURE: 112 MMHG | HEIGHT: 61 IN | DIASTOLIC BLOOD PRESSURE: 68 MMHG | WEIGHT: 169.2 LBS | BODY MASS INDEX: 31.95 KG/M2 | HEART RATE: 104 BPM | RESPIRATION RATE: 16 BRPM

## 2024-07-01 DIAGNOSIS — E11.65 TYPE 2 DIABETES MELLITUS WITH HYPERGLYCEMIA, WITHOUT LONG-TERM CURRENT USE OF INSULIN (HCC): ICD-10-CM

## 2024-07-01 DIAGNOSIS — I10 HTN (HYPERTENSION), MALIGNANT: ICD-10-CM

## 2024-07-01 DIAGNOSIS — N18.32 STAGE 3B CHRONIC KIDNEY DISEASE (CKD): ICD-10-CM

## 2024-07-01 DIAGNOSIS — I50.30 ACC/AHA STAGE C HEART FAILURE WITH PRESERVED EJECTION FRACTION (HCC): ICD-10-CM

## 2024-07-01 DIAGNOSIS — Z79.899 HIGH RISK MEDICATION USE: ICD-10-CM

## 2024-07-01 DIAGNOSIS — I50.9 HEART FAILURE, NYHA CLASS 2 (HCC): ICD-10-CM

## 2024-07-01 PROCEDURE — 99212 OFFICE O/P EST SF 10 MIN: CPT | Performed by: NURSE PRACTITIONER

## 2024-07-01 PROCEDURE — 3074F SYST BP LT 130 MM HG: CPT | Performed by: NURSE PRACTITIONER

## 2024-07-01 PROCEDURE — 99214 OFFICE O/P EST MOD 30 MIN: CPT | Performed by: NURSE PRACTITIONER

## 2024-07-01 PROCEDURE — 3078F DIAST BP <80 MM HG: CPT | Performed by: NURSE PRACTITIONER

## 2024-07-01 ASSESSMENT — ENCOUNTER SYMPTOMS
FEVER: 0
MYALGIAS: 0
ABDOMINAL PAIN: 0
COUGH: 0
SHORTNESS OF BREATH: 0
HEADACHES: 0
ORTHOPNEA: 0
PND: 0
DIZZINESS: 0
CLAUDICATION: 0
PALPITATIONS: 0

## 2024-12-06 ENCOUNTER — TELEPHONE (OUTPATIENT)
Dept: CARDIOLOGY | Facility: MEDICAL CENTER | Age: 79
End: 2024-12-06
Payer: MEDICARE

## 2024-12-06 NOTE — TELEPHONE ENCOUNTER
Call placed to patient as a reminder for blood work. Per patients request labs have been faxed to [facility]. Fax confirmation received and sent to [facility] for scanning.

## 2024-12-12 ENCOUNTER — HOSPITAL ENCOUNTER (OUTPATIENT)
Dept: CARDIOLOGY | Facility: MEDICAL CENTER | Age: 79
End: 2024-12-12
Attending: NURSE PRACTITIONER
Payer: MEDICARE

## 2024-12-12 VITALS
HEIGHT: 61 IN | RESPIRATION RATE: 18 BRPM | BODY MASS INDEX: 32.4 KG/M2 | WEIGHT: 171.6 LBS | HEART RATE: 97 BPM | SYSTOLIC BLOOD PRESSURE: 120 MMHG | DIASTOLIC BLOOD PRESSURE: 60 MMHG | OXYGEN SATURATION: 91 %

## 2024-12-12 DIAGNOSIS — Z79.899 HIGH RISK MEDICATION USE: ICD-10-CM

## 2024-12-12 DIAGNOSIS — I50.9 HEART FAILURE, NYHA CLASS 2 (HCC): ICD-10-CM

## 2024-12-12 DIAGNOSIS — I10 HTN (HYPERTENSION), MALIGNANT: ICD-10-CM

## 2024-12-12 DIAGNOSIS — I50.30 ACC/AHA STAGE C HEART FAILURE WITH PRESERVED EJECTION FRACTION (HCC): ICD-10-CM

## 2024-12-12 DIAGNOSIS — E11.65 TYPE 2 DIABETES MELLITUS WITH HYPERGLYCEMIA, WITHOUT LONG-TERM CURRENT USE OF INSULIN (HCC): ICD-10-CM

## 2024-12-12 DIAGNOSIS — N18.32 STAGE 3B CHRONIC KIDNEY DISEASE (CKD): ICD-10-CM

## 2024-12-12 LAB
LV EJECT FRACT MOD 2C 99903: 53.98
LV EJECT FRACT MOD 4C 99902: 63.99
LV EJECT FRACT MOD BP 99901: 59.29

## 2024-12-12 PROCEDURE — 3078F DIAST BP <80 MM HG: CPT | Performed by: NURSE PRACTITIONER

## 2024-12-12 PROCEDURE — 3074F SYST BP LT 130 MM HG: CPT | Performed by: NURSE PRACTITIONER

## 2024-12-12 PROCEDURE — 99214 OFFICE O/P EST MOD 30 MIN: CPT | Performed by: NURSE PRACTITIONER

## 2024-12-12 PROCEDURE — 99212 OFFICE O/P EST SF 10 MIN: CPT | Performed by: NURSE PRACTITIONER

## 2024-12-12 PROCEDURE — 93306 TTE W/DOPPLER COMPLETE: CPT | Mod: 26 | Performed by: INTERNAL MEDICINE

## 2024-12-12 PROCEDURE — 93306 TTE W/DOPPLER COMPLETE: CPT

## 2024-12-12 ASSESSMENT — ENCOUNTER SYMPTOMS
PND: 0
FEVER: 0
ORTHOPNEA: 0
PALPITATIONS: 0
CLAUDICATION: 0
COUGH: 0
DIZZINESS: 1
HEADACHES: 0
SHORTNESS OF BREATH: 0
MYALGIAS: 0
ABDOMINAL PAIN: 0

## 2024-12-12 NOTE — PROGRESS NOTES
Chief Complaint   Patient presents with    Follow-Up     Dx: ACC/AHA stage C heart failure with preserved ejection fraction (HCC)           Subjective     Rosmery Landaverde is a 79 y.o. female who presents today for follow-up on her heart failure.    Patient Dr. Soto.  She was last seen in clinic on 7/1/2024 with myself.  No changes were made to her regimen at her last visit, she was sent for lab testing in 6 months.    She reports she has been doing well over the last 6 months.  She does mention having some slight lower extremity edema that comes and goes and occasional episodes of dizziness with position changes.  She is tired today from the driving.    She denies chest pain, palpitations, orthopnea, PND or shortness of breath.    Since her last visit, she mentions she was diagnosed with sleep apnea and has been prescribed a CPAP/BiPAP with oxygen bleed in.  She does note having difficulty with her mask and needs to look into it more.    She has not been weighing herself at home because she reports her scale is out of battery.    He reports compliance with all of her medications.    Patient is seeing her PCP regularly.  She believes she is being treated for diabetes.    Patient lives in [place], [place].    History reviewed. No pertinent past medical history.  History reviewed. No pertinent surgical history.  History reviewed. No pertinent family history.  Social History     Socioeconomic History    Marital status: Single     Spouse name: Not on file    Number of children: Not on file    Years of education: Not on file    Highest education level: Not on file   Occupational History    Not on file   Tobacco Use    Smoking status: Some Days     Current packs/day: 0.50     Average packs/day: 0.5 packs/day for 62.5 years (31.3 ttl pk-yrs)     Types: Cigarettes     Start date: 6/10/1962    Smokeless tobacco: Never    Tobacco comments:     smokes 1/2 Pack on some day of the week, someday no smoking   Vaping Use    Vaping  status: Never Used   Substance and Sexual Activity    Alcohol use: Yes     Alcohol/week: 2.4 oz     Types: 4 Cans of beer per week     Comment: few beers a couple days per week      Drug use: Never    Sexual activity: Not on file   Other Topics Concern    Not on file   Social History Narrative    Not on file     Social Drivers of Health     Financial Resource Strain: Not on file   Food Insecurity: Not on file   Transportation Needs: Not on file   Physical Activity: Not on file   Stress: Not on file   Social Connections: Not on file   Intimate Partner Violence: Not on file   Housing Stability: Not on file     Allergies   Allergen Reactions    Morphine      Severe reaction      Outpatient Encounter Medications as of 12/12/2024   Medication Sig Dispense Refill    Empagliflozin 10 MG Tab Take 25 mg by mouth every day.      docusate sodium (COLACE) 100 MG Cap Take 100 mg by mouth every day.      buPROPion SR (WELLBUTRIN-SR) 150 MG TABLET SR 12 HR sustained-release tablet Take 150 mg by mouth 2 times a day.      glipiZIDE (GLUCOTROL) 5 MG Tab Take 5 mg by mouth every day.      Carboxymethylcell-Glycerin PF 0.5-0.9 % Solution Administer  into affected eye(s) as needed.      spironolactone (ALDACTONE) 25 MG Tab Take 25 mg by mouth every day.      potassium chloride ER (KLOR-CON) 10 MEQ tablet Take 1 Tablet by mouth every day. 90 Tablet 3    vitamin D2, Ergocalciferol, (DRISDOL) 1.25 MG (40391 UT) Cap capsule Take  by mouth every 7 days.      metoprolol tartrate (LOPRESSOR) 25 MG Tab Take 50 mg by mouth 2 times a day.      furosemide (LASIX) 40 MG Tab Take 1 tablet by mouth every day. 30 tablet 11     No facility-administered encounter medications on file as of 12/12/2024.     Review of Systems   Constitutional:  Positive for malaise/fatigue. Negative for fever.   Respiratory:  Negative for cough and shortness of breath.    Cardiovascular:  Positive for leg swelling (comes and goes). Negative for chest pain, palpitations,  "orthopnea, claudication and PND.   Gastrointestinal:  Negative for abdominal pain.   Musculoskeletal:  Negative for myalgias.   Neurological:  Positive for dizziness (at times when changing positions). Negative for headaches.   All other systems reviewed and are negative.             Objective     /60 (BP Location: Left arm, Patient Position: Sitting, BP Cuff Size: Adult)   Pulse 97   Resp 18   Ht 1.549 m (5' 1\")   Wt 77.8 kg (171 lb 9.6 oz)   SpO2 91%   BMI 32.42 kg/m²     Physical Exam  Vitals reviewed.   Constitutional:       Appearance: She is well-developed. She is obese.   HENT:      Head: Normocephalic and atraumatic.   Eyes:      Pupils: Pupils are equal, round, and reactive to light.   Neck:      Vascular: No JVD.   Cardiovascular:      Rate and Rhythm: Normal rate and regular rhythm.      Heart sounds: Normal heart sounds.   Pulmonary:      Effort: Pulmonary effort is normal. No respiratory distress.      Breath sounds: Normal breath sounds. No wheezing or rales.   Abdominal:      General: Bowel sounds are normal.      Palpations: Abdomen is soft.   Musculoskeletal:         General: Normal range of motion.      Cervical back: Normal range of motion and neck supple.      Right lower leg: No edema.      Left lower leg: No edema.   Skin:     General: Skin is warm and dry.   Neurological:      General: No focal deficit present.      Mental Status: She is alert and oriented to person, place, and time.   Psychiatric:         Behavior: Behavior normal.       No results found for: \"CHOLSTRLTOT\", \"LDL\", \"HDL\", \"TRIGLYCERIDE\"    Lab Results   Component Value Date/Time    SODIUM 140 01/31/2022 12:03 PM    POTASSIUM 3.0 (L) 01/31/2022 12:03 PM    CHLORIDE 96 01/31/2022 12:03 PM    CO2 28 01/31/2022 12:03 PM    GLUCOSE 102 (H) 01/31/2022 12:03 PM    BUN 13 01/31/2022 12:03 PM    CREATININE 1.47 (H) 01/31/2022 12:03 PM     No results found for: \"ALKPHOSPHAT\", \"ASTSGOT\", \"ALTSGPT\", \"TBILIRUBIN\" "     Transthoracic Echo Report 8/19/2021  No prior study is available for comparison.   Normal left ventricular systolic function.   No evidence of valvular abnormality based on Doppler evaluation.       Echo from today pending    Assessment & Plan     1. ACC/AHA stage C heart failure with preserved ejection fraction (HCC)        2. Heart failure, NYHA class 2 (HCC)        3. Stage 3b chronic kidney disease (CKD)        4. HTN (hypertension), malignant        5. High risk medication use        6. Type 2 diabetes mellitus with hyperglycemia, without long-term current use of insulin ([facility])                Medical Decision Making: Today's Assessment/Status/Plan:             HFpEF, Stage C, Class 1-2, LVEF 60%: Based on physical examination findings, patient is euvolemic. No JVD, lungs are clear to auscultation, no pitting edema in bilateral lower extremities, no ascites.  -Patient has comorbid obesity, questionable sleep apnea, patient has enlarged RV per last echo, echo from today pending, will reach out to patient with results once received  -At this time, patient is stable  -Recommend low-sodium diet  -Furosemide 40 mg daily PRN if swelling worsens  -Continue spironolactone 25 mg daily (patient not able to afford Kerendia)  -Continue empagliflozin 25 mg daily  -Continue metoprolol tartrate 50 mg twice a day  -Continue potassium 10 mEq daily  -No labs due at next visit  -Reinforced s/sx of worsening heart failure with patient and weight monitoring. Pt verbalizes understanding. Pt to call office or RTC if present.     CKD stage III:  -Stable, creatinine 1.3, BUN 18 on labs from 12/10/2024  -Patient no longer taking Kerendia, not able to afford    Diabetes:   -A1c on 3/4/2024 was 7.6  -Patient is being followed by her PCP, she believes she is taking glipizide along with her empagliflozin  -Continue empagliflozin 25 mg daily    Hypertension:  -Stable  -Continue metoprolol 50 mg twice a day    Sleep apnea:   -Patient went  for her sleep apnea per PCP, reports starting CPAP/BiPAP with oxygen bleed in.  She has been using for about a month, but is currently having difficulty with the mask.  She will reach out to her PCP office for assistance    FU in clinic in 6 months. Sooner if needed.    Patient verbalizes understanding and agrees with the plan of care.     PLEASE NOTE: This Note was created using voice recognition Software. I have made every reasonable attempt to correct obvious errors, but I expect that there are errors of grammar and possibly content that I did not discover before finalizing the note

## 2024-12-12 NOTE — TELEPHONE ENCOUNTER
Labs requested from [facility]. Fax confirmation received and sent to [facility] for scanning.'      Pending results.

## 2024-12-13 NOTE — RESULT ENCOUNTER NOTE
Patient was seen in office today, but her echo results were not available by the time she was seen.  Can you please let her know that I reviewed the results and her heart function is staying stable.  No changes recommended at this time.

## 2025-06-09 ENCOUNTER — APPOINTMENT (OUTPATIENT)
Dept: CARDIOLOGY | Facility: MEDICAL CENTER | Age: 80
End: 2025-06-09
Attending: NURSE PRACTITIONER
Payer: MEDICARE

## 2025-06-23 ENCOUNTER — TELEPHONE (OUTPATIENT)
Dept: CARDIOLOGY | Facility: MEDICAL CENTER | Age: 80
End: 2025-06-23
Payer: MEDICARE

## 2025-06-23 NOTE — TELEPHONE ENCOUNTER
SOFÍA     Caller: Rosmery Landaverde    Where labs will be completed: [facility]    Fax/Phone number for lab: [phone] - ATTN [place]     Upcoming Appointment Date: 06.26.25    Callback Number: [phone]    Patient would like a phone call when labs are sent.     Thank you,    Kathleen HECK

## 2025-06-23 NOTE — TELEPHONE ENCOUNTER
Labs have been faxed over to [facility]. Fax confirmation has been received and sent to scan through [employer].

## 2025-06-26 ENCOUNTER — OFFICE VISIT (OUTPATIENT)
Dept: CARDIOLOGY | Facility: MEDICAL CENTER | Age: 80
End: 2025-06-26
Attending: NURSE PRACTITIONER
Payer: MEDICARE

## 2025-06-26 ENCOUNTER — TELEPHONE (OUTPATIENT)
Dept: CARDIOLOGY | Facility: MEDICAL CENTER | Age: 80
End: 2025-06-26
Payer: MEDICARE

## 2025-06-26 VITALS
BODY MASS INDEX: 35.3 KG/M2 | WEIGHT: 187 LBS | DIASTOLIC BLOOD PRESSURE: 62 MMHG | HEART RATE: 122 BPM | SYSTOLIC BLOOD PRESSURE: 100 MMHG | HEIGHT: 61 IN | OXYGEN SATURATION: 93 % | RESPIRATION RATE: 16 BRPM

## 2025-06-26 DIAGNOSIS — I50.30 ACC/AHA STAGE C HEART FAILURE WITH PRESERVED EJECTION FRACTION (HCC): Primary | ICD-10-CM

## 2025-06-26 DIAGNOSIS — Z79.899 HIGH RISK MEDICATION USE: ICD-10-CM

## 2025-06-26 DIAGNOSIS — I50.9 HEART FAILURE, NYHA CLASS 2 (HCC): ICD-10-CM

## 2025-06-26 DIAGNOSIS — N18.32 STAGE 3B CHRONIC KIDNEY DISEASE (CKD): ICD-10-CM

## 2025-06-26 DIAGNOSIS — E11.65 TYPE 2 DIABETES MELLITUS WITH HYPERGLYCEMIA, WITHOUT LONG-TERM CURRENT USE OF INSULIN (HCC): ICD-10-CM

## 2025-06-26 DIAGNOSIS — G47.33 OBSTRUCTIVE SLEEP APNEA SYNDROME: ICD-10-CM

## 2025-06-26 DIAGNOSIS — I10 HTN (HYPERTENSION), MALIGNANT: ICD-10-CM

## 2025-06-26 DIAGNOSIS — R00.0 TACHYCARDIA: ICD-10-CM

## 2025-06-26 PROCEDURE — 93005 ELECTROCARDIOGRAM TRACING: CPT | Mod: TC | Performed by: NURSE PRACTITIONER

## 2025-06-26 PROCEDURE — 99213 OFFICE O/P EST LOW 20 MIN: CPT | Performed by: NURSE PRACTITIONER

## 2025-06-26 RX ORDER — METOPROLOL SUCCINATE 100 MG/1
100 TABLET, EXTENDED RELEASE ORAL DAILY
Qty: 90 TABLET | Refills: 3 | Status: SHIPPED | OUTPATIENT
Start: 2025-06-26 | End: 2025-06-26

## 2025-06-26 RX ORDER — METOPROLOL SUCCINATE 25 MG/1
25 TABLET, EXTENDED RELEASE ORAL DAILY
Qty: 90 TABLET | Refills: 3 | Status: SHIPPED | OUTPATIENT
Start: 2025-06-26

## 2025-06-26 ASSESSMENT — MINNESOTA LIVING WITH HEART FAILURE QUESTIONNAIRE (MLHF)
DIFFICULTY WITH SEXUAL ACTIVITIES: 0
EATING LESS FOODS YOU LIKE: 4
DIFFICULTY GOING AWAY FROM HOME: 4
HAVING TO SIT OR LIE DOWN DURING THE DAY: 3
DIFFICULTY TO CONCENTRATE OR REMEMBERING THINGS: 3
WALKING ABOUT OR CLIMBING STAIRS DIFFICULT: 4
DIFFICULTY WORKING TO EARN A LIVING: 4
DIFFICULTY SOCIALIZING WITH FAMILY OR FRIENDS: 4
LOSS OF SELF CONTROL IN YOUR LIFE: 4
COSTING YOU MONEY FOR MEDICAL CARE: 3
MAKING YOU SHORT OF BREATH: 4
TIRED, FATIGUED OR LOW ON ENERGY: 5
TOTAL_SCORE: 69
DIFFICULTY SLEEPING WELL AT NIGHT: 4
MAKING YOU WORRY: 5
DIFFICULTY WITH RECREATIONAL PASTIMES, SPORTS, HOBBIES: 4
GIVING YOU SIDE EFFECTS FROM TREATMENTS: 0
SWELLING IN ANKLES OR LEGS: 3
FEELING LIKE A BURDEN TO FAMILY AND FRIENDS: 5
MAKING YOU FEEL DEPRESSED: 2
MAKING YOU STAY IN A HOSPITAL: 0
WORKING AROUND THE HOUSE OR YARD DIFFICULT: 4

## 2025-06-26 ASSESSMENT — ENCOUNTER SYMPTOMS
HEADACHES: 0
ORTHOPNEA: 0
SHORTNESS OF BREATH: 1
PND: 0
CLAUDICATION: 0
FEVER: 0
COUGH: 0
ABDOMINAL PAIN: 0
DIZZINESS: 0
PALPITATIONS: 0
MYALGIAS: 0

## 2025-06-26 NOTE — PROGRESS NOTES
Chief Complaint   Patient presents with    Hypertension     F/V Dx:HTN (hypertension), malignant         Subjective     Rosmery Landaverde is a 80 y.o. female who presents today for follow-up on her heart failure with her daughter.    Patient Dr. Soto.  She was last seen in clinic on 12/12/2024 with myself.  No changes were made to her regimen at her last visit, she had completed her Echo that day, but results were still pending.    Pt reports she is doing ok. She does feel tire and has been having some SOB likely due to allergies.  She continues to have swelling that comes and goes.    She reports she has been doing well over the last 6 months.  She does mention having some slight lower extremity edema that comes and goes and occasional episodes of dizziness with position changes.  She is tired today from the driving.    She denies chest pain, palpitations, orthopnea, PND or shortness of breath.    Since her last visit, she mentions she was diagnosed with sleep apnea and has been prescribed a CPAP/BiPAP with oxygen bleed in.  She does note having difficulty with her mask and needs to look into it more.    She has not been weighing herself at home because she reports her scale is out of battery.    He reports compliance with all of her medications.    Patient is seeing her PCP regularly.  She believes she is being treated for diabetes.    Patient lives in [place], [place].    History reviewed. No pertinent past medical history.  History reviewed. No pertinent surgical history.  History reviewed. No pertinent family history.  Social History     Socioeconomic History    Marital status: Single     Spouse name: Not on file    Number of children: Not on file    Years of education: Not on file    Highest education level: Not on file   Occupational History    Not on file   Tobacco Use    Smoking status: Some Days     Current packs/day: 0.50     Average packs/day: 0.5 packs/day for 63.0 years (31.5 ttl pk-yrs)     Types:  Cigarettes     Start date: 6/10/1962    Smokeless tobacco: Never    Tobacco comments:     smokes 1/2 Pack on some day of the week, someday no smoking   Vaping Use    Vaping status: Never Used   Substance and Sexual Activity    Alcohol use: Yes     Alcohol/week: 2.4 oz     Types: 4 Cans of beer per week     Comment: few beers a couple days per week      Drug use: Never    Sexual activity: Not on file   Other Topics Concern    Not on file   Social History Narrative    Not on file     Social Drivers of Health     Financial Resource Strain: Not on file   Food Insecurity: Not on file   Transportation Needs: Not on file   Physical Activity: Not on file   Stress: Not on file   Social Connections: Not on file   Intimate Partner Violence: Not on file   Housing Stability: Not on file     Allergies   Allergen Reactions    Morphine      Severe reaction      Outpatient Encounter Medications as of 6/26/2025   Medication Sig Dispense Refill    metoprolol SR (TOPROL XL) 25 MG TABLET SR 24 HR Take 1 Tablet by mouth every day. 90 Tablet 3    glipiZIDE (GLUCOTROL) 5 MG Tab Take 5 mg by mouth every day.      Carboxymethylcell-Glycerin PF 0.5-0.9 % Solution Administer  into affected eye(s) as needed.      spironolactone (ALDACTONE) 25 MG Tab Take 25 mg by mouth every day.      potassium chloride ER (KLOR-CON) 10 MEQ tablet Take 1 Tablet by mouth every day. 90 Tablet 3    Empagliflozin 10 MG Tab Take 25 mg by mouth every day.      vitamin D2, Ergocalciferol, (DRISDOL) 1.25 MG (03907 UT) Cap capsule Take  by mouth every 7 days.      docusate sodium (COLACE) 100 MG Cap Take 100 mg by mouth every day.      buPROPion SR (WELLBUTRIN-SR) 150 MG TABLET SR 12 HR sustained-release tablet Take 150 mg by mouth 2 times a day.      furosemide (LASIX) 40 MG Tab Take 1 tablet by mouth every day. 30 tablet 11    [DISCONTINUED] metoprolol SR (TOPROL XL) 100 MG TABLET SR 24 HR Take 1 Tablet by mouth every day. 90 Tablet 3    [DISCONTINUED] metoprolol  "tartrate (LOPRESSOR) 25 MG Tab Take 50 mg by mouth 2 times a day.       No facility-administered encounter medications on file as of 6/26/2025.     Review of Systems   Constitutional:  Positive for malaise/fatigue. Negative for fever.   Respiratory:  Positive for shortness of breath. Negative for cough.    Cardiovascular:  Positive for leg swelling (comes and goes). Negative for chest pain, palpitations, orthopnea, claudication and PND.   Gastrointestinal:  Negative for abdominal pain.   Musculoskeletal:  Negative for myalgias.   Neurological:  Negative for dizziness and headaches.   Endo/Heme/Allergies:  Positive for environmental allergies.   All other systems reviewed and are negative.             Objective     /62 (BP Location: Left arm, Patient Position: Sitting, BP Cuff Size: Adult)   Pulse (!) 122   Resp 16   Ht 1.549 m (5' 1\")   Wt 84.8 kg (187 lb)   SpO2 93%   BMI 35.33 kg/m²     Physical Exam  Vitals reviewed.   Constitutional:       Appearance: She is well-developed. She is obese.   HENT:      Head: Normocephalic and atraumatic.   Eyes:      Pupils: Pupils are equal, round, and reactive to light.   Neck:      Vascular: No JVD.   Cardiovascular:      Rate and Rhythm: Normal rate and regular rhythm.      Heart sounds: Normal heart sounds.   Pulmonary:      Effort: Pulmonary effort is normal. No respiratory distress.      Breath sounds: Normal breath sounds. No wheezing or rales.   Abdominal:      General: Bowel sounds are normal.      Palpations: Abdomen is soft.   Musculoskeletal:         General: Normal range of motion.      Cervical back: Normal range of motion and neck supple.      Right lower leg: No edema.      Left lower leg: No edema.   Skin:     General: Skin is warm and dry.   Neurological:      General: No focal deficit present.      Mental Status: She is alert and oriented to person, place, and time.   Psychiatric:         Behavior: Behavior normal.       No results found for: " "\"CHOLSTRLTOT\", \"LDL\", \"HDL\", \"TRIGLYCERIDE\"    Lab Results   Component Value Date/Time    SODIUM 140 01/31/2022 12:03 PM    POTASSIUM 3.0 (L) 01/31/2022 12:03 PM    CHLORIDE 96 01/31/2022 12:03 PM    CO2 28 01/31/2022 12:03 PM    GLUCOSE 102 (H) 01/31/2022 12:03 PM    BUN 13 01/31/2022 12:03 PM    CREATININE 1.47 (H) 01/31/2022 12:03 PM     No results found for: \"ALKPHOSPHAT\", \"ASTSGOT\", \"ALTSGPT\", \"TBILIRUBIN\"     Transthoracic Echo Report 8/19/2021  No prior study is available for comparison.   Normal left ventricular systolic function.   No evidence of valvular abnormality based on Doppler evaluation.         Transthoracic Echo Report 12/12/2024  Grossly normal left ventricular systolic function.  The ejection fraction is roughly measured to be 59% by Rivero's   biplane.  Indeterminate diastolic function due to image quality, however, by ASE criteria diastolic function is probably normal for age.  Normal right ventricular size and systolic function.  Unable to estimate pulmonary artery pressure due to an inadequate   tricuspid regurgitant jet.  Normal left atrial size.  No significant valvular abnormalities.   Borderline dilated inferior vena cava size.  Inspiratory collapse was not well assessed.    Assessment & Plan     1. ACC/AHA stage C heart failure with preserved ejection fraction (HCC)  EKG    metoprolol SR (TOPROL XL) 25 MG TABLET SR 24 HR    DISCONTINUED: metoprolol SR (TOPROL XL) 100 MG TABLET SR 24 HR      2. Tachycardia  EKG      3. Heart failure, NYHA class 2 (HCC)        4. Stage 3b chronic kidney disease (CKD)        5. Type 2 diabetes mellitus with hyperglycemia, without long-term current use of insulin (HCC)        6. HTN (hypertension), malignant        7. Obstructive sleep apnea syndrome        8. High risk medication use                  Medical Decision Making: Today's Assessment/Status/Plan:             HFpEF, Stage C, Class 1-2, LVEF 59%: Patient does have trace lower extremity edema, " slight crackles in the lungs, but pending chest x-ray per PCP  -Patient has comorbid obesity, questionable sleep apnea, patient has enlarged RV per last echo  - Reviewed results of echo, RV now normal  -Continue low-sodium diet  -Furosemide 40 mg daily PRN if swelling worsens, recommend trying doses for 3 days, then resume as needed  - Patient appears to be off of spironolactone for unclear reasons, will hold off on adding at this time (patient not able to afford Kerendia)  -Continue empagliflozin 25 mg daily  - I am not sure if patient still taking metoprolol tartrate, her heart rate is elevated, EKG performed which shows sinus rhythm 98, I will have my nurse call her pharmacy and verify if she has picked up her medication recently, but I will have her start metoprolol succinate 25 mg daily unless she is taking metoprolol tartrate  -Continue potassium 10 mEq daily  -No labs due at this time, recently had labs due per PCP per office note  -Reinforced s/sx of worsening heart failure with patient and weight monitoring. Pt verbalizes understanding. Pt to call office or RTC if present.     CKD stage III:  -Stable  -Patient no longer taking Kerendia, not able to afford    Diabetes:   -A1c worsening on recent office note  -Patient is being followed by her PCP, PCP was considering insulin  -Continue empagliflozin 25 mg daily    Hypertension:  - Stable  - does not appear to be on Spironolactone anymore, hold off on adding  - Metoprolol to be added per above    Sleep apnea:   - She has not been using her CPAP/BiPAP, encourage patient to restart.  Discussed the importance of this.    FU in clinic in 6 months. Sooner if needed.    Patient verbalizes understanding and agrees with the plan of care.     PLEASE NOTE: This Note was created using voice recognition Software. I have made every reasonable attempt to correct obvious errors, but I expect that there are errors of grammar and possibly content that I did not discover before  finalizing the note

## 2025-06-26 NOTE — PATIENT INSTRUCTIONS
Stop metoprolol Tartrate if taking, Start Metoprolol Succinate 25 mg daily     Take furosemide 40 mg for 3 days.     Start monitoring weights at home daily. Call office if weight increasing greater than 3 lbs in 1 day or greater than 5 lbs in 1 week.      Restart CPAP

## 2025-06-26 NOTE — TELEPHONE ENCOUNTER
Received notice from [facility]. Request to follow up with pt's pharmacy to confirm if pt is taking metoprolol tartrate. If so, we need to confirm switch to metoprolol succinate 25 mg daily. A previous order for metoprolol tartrate 100mg was sent and this needs to be cancelled. Pt should not be on both tartrate and succinate. She wants to confirm RX for metoprolol succinate 25 mg daily was received from today.    Phone Number Called:  [phone] [facility]    Call outcome: Spoke with Toni.     Message: Called to discuss above. Toni confirmed pt did not recently  metoprolol tartrate. They cancelled a previous order for metoprolol tartrate 100 mg and confirmed they received the metoprolol succinate 25 mg.

## 2025-06-27 LAB — EKG IMPRESSION: NORMAL

## 2025-07-10 ENCOUNTER — HOSPITAL ENCOUNTER (OUTPATIENT)
Dept: RADIOLOGY | Facility: MEDICAL CENTER | Age: 80
End: 2025-07-10
Attending: NURSE PRACTITIONER
Payer: MEDICARE

## 2025-07-10 DIAGNOSIS — J44.9 VANISHING LUNG (HCC): ICD-10-CM

## 2025-07-10 PROCEDURE — 71046 X-RAY EXAM CHEST 2 VIEWS: CPT
